# Patient Record
Sex: MALE | Race: WHITE | NOT HISPANIC OR LATINO | Employment: OTHER | URBAN - METROPOLITAN AREA
[De-identification: names, ages, dates, MRNs, and addresses within clinical notes are randomized per-mention and may not be internally consistent; named-entity substitution may affect disease eponyms.]

---

## 2021-01-20 ENCOUNTER — HOSPITAL ENCOUNTER (EMERGENCY)
Facility: CLINIC | Age: 76
Discharge: HOME | End: 2021-01-20
Attending: EMERGENCY MEDICINE | Admitting: EMERGENCY MEDICINE
Payer: MEDICARE

## 2021-01-20 VITALS
HEART RATE: 84 BPM | SYSTOLIC BLOOD PRESSURE: 161 MMHG | OXYGEN SATURATION: 95 % | TEMPERATURE: 98 F | RESPIRATION RATE: 18 BRPM | DIASTOLIC BLOOD PRESSURE: 89 MMHG

## 2021-01-20 DIAGNOSIS — M27.61 HEMORRHAGIC COMPLICATIONS OF DENTAL IMPLANT PLACEMENT: ICD-10-CM

## 2021-01-20 LAB
BASOPHILS # BLD AUTO: 0 10E9/L (ref 0–0.2)
BASOPHILS NFR BLD AUTO: 0.1 %
DIFFERENTIAL METHOD BLD: ABNORMAL
EOSINOPHIL # BLD AUTO: 0 10E9/L (ref 0–0.7)
EOSINOPHIL NFR BLD AUTO: 0.1 %
ERYTHROCYTE [DISTWIDTH] IN BLOOD BY AUTOMATED COUNT: 14.9 % (ref 10–15)
HCT VFR BLD AUTO: 39.5 % (ref 40–53)
HGB BLD-MCNC: 13.2 G/DL (ref 13.3–17.7)
IMM GRANULOCYTES # BLD: 0.1 10E9/L (ref 0–0.4)
IMM GRANULOCYTES NFR BLD: 0.5 %
INR PPP: 1.07 (ref 0.86–1.14)
LYMPHOCYTES # BLD AUTO: 0.3 10E9/L (ref 0.8–5.3)
LYMPHOCYTES NFR BLD AUTO: 2.5 %
MCH RBC QN AUTO: 29.9 PG (ref 26.5–33)
MCHC RBC AUTO-ENTMCNC: 33.4 G/DL (ref 31.5–36.5)
MCV RBC AUTO: 89 FL (ref 78–100)
MONOCYTES # BLD AUTO: 0.4 10E9/L (ref 0–1.3)
MONOCYTES NFR BLD AUTO: 3.6 %
NEUTROPHILS # BLD AUTO: 9.8 10E9/L (ref 1.6–8.3)
NEUTROPHILS NFR BLD AUTO: 93.2 %
NRBC # BLD AUTO: 0 10*3/UL
NRBC BLD AUTO-RTO: 0 /100
PLATELET # BLD AUTO: 192 10E9/L (ref 150–450)
RBC # BLD AUTO: 4.42 10E12/L (ref 4.4–5.9)
WBC # BLD AUTO: 10.5 10E9/L (ref 4–11)

## 2021-01-20 PROCEDURE — 85610 PROTHROMBIN TIME: CPT | Performed by: EMERGENCY MEDICINE

## 2021-01-20 PROCEDURE — 99283 EMERGENCY DEPT VISIT LOW MDM: CPT | Performed by: EMERGENCY MEDICINE

## 2021-01-20 PROCEDURE — 85025 COMPLETE CBC W/AUTO DIFF WBC: CPT | Performed by: EMERGENCY MEDICINE

## 2021-01-20 PROCEDURE — 99284 EMERGENCY DEPT VISIT MOD MDM: CPT | Performed by: EMERGENCY MEDICINE

## 2021-01-20 ASSESSMENT — ENCOUNTER SYMPTOMS: LIGHT-HEADEDNESS: 0

## 2021-01-21 NOTE — ED TRIAGE NOTES
BIBA from home pt had some teeth pulled this evening and now is having spontaneous bleeding starting at 2000. Pt takes aspirin, last taken yesterday  Diana Alejandre RN on 1/20/2021 at 8:55 PM

## 2021-01-21 NOTE — CONSULTS
ORAL & MAXILLOFACIAL SURGERY   CONSULT  Vijay Fernandez,  MRN: [id number],  : 1945        ASSESSMENT   75 year old male s/p full mouth extraction and placement of 4 dental implants in maxilla and mandible. Patient indicates that he went to [facility] in [place] around 1:30 PM and stated that the bleeding never really stopped, he was then BIBA. When patient was assessed he was hemostatic.       PLAN  - Avoid further trauma to area of extractions/implants  - Soft diet  - Salt water rinses to clean area  - Direct pressure if bleeding starts again  - Follow up with restoring dentist      Please contact the FS resident on-call with questions or concerns.    Discussed with chief resident and staff.    Jodie Catalan DDS  Oral & Maxillofacial Surgery, PGY-1    ____________________________________________      HPI  75 year old male with history significant for hypertension and hyperlipidemia s/p full mouth dental extraction and placement of 4 implants in maxilla and 4 implants in mandible this afternoon. Patient reports continued bleeding after appointment, he became concerned at home and was BIBA. When patient was assessed in the ED he was hemostatic.     HISTORY  Past Medical History: No past medical history on file.  Past Surgical History: No past surgical history on file.  Medications:   No current facility-administered medications on file prior to encounter.   No current outpatient medications on file prior to encounter.       Allergies: No Known Allergies  Social History:   Social History     Socioeconomic History     Marital status: Single     Spouse name: Not on file     Number of children: Not on file     Years of education: Not on file     Highest education level: Not on file   Occupational History     Not on file   Social Needs     Financial resource strain: Not on file     Food insecurity     Worry: Not on file     Inability: Not on file     Transportation needs     Medical: Not on  file     Non-medical: Not on file   Tobacco Use     Smoking status: Not on file   Substance and Sexual Activity     Alcohol use: Not on file     Drug use: Not on file     Sexual activity: Not on file   Lifestyle     Physical activity     Days per week: Not on file     Minutes per session: Not on file     Stress: Not on file   Relationships     Social connections     Talks on phone: Not on file     Gets together: Not on file     Attends [employer] service: Not on file     Active member of club or organization: Not on file     Attends meetings of clubs or organizations: Not on file     Relationship status: Not on file     Intimate partner violence     Fear of current or ex partner: Not on file     Emotionally abused: Not on file     Physically abused: Not on file     Forced sexual activity: Not on file   Other Topics Concern     Not on file   Social History Narrative     Not on file       REVIEW OF SYSTEMS  10 point ROS reviewed and negative aside from listed in HPI    PHYSICAL EXAM  Vital Signs:   Vitals:    01/20/21 2055   BP: (!) 140/74   Pulse: 86   Resp: 18   Temp: 98  F (36.7  C)   TempSrc: Axillary   SpO2: 95%       GEN: WD/WN male, NAD  HEENT: NC/AT, EOMI, PERRL, dried blood present on face and neck, patient worried about a possible cut on his palate, palate appears to have dried blood present and no laceration, area of extractions/implants have sutures present and are hemostatic  CV: RRR, no M/G/R, warm and well-perfused  PULM: CTAB, breathing comfortably on room air  GI: Soft, ND/NT  MSK: JOSHI, no peripheral extremity edema  NEURO: AAOx4, CN II-XII intact bilaterally  PSYCH: Appropriate mood and affect            REVIEW OF LABORATORY DATA  Most Recent 3 CBC's:  Recent Labs   Lab Test 01/20/21  2133   WBC 10.5   HGB 13.2*   MCV 89         Most Recent 3 BMP's:No lab results found.  Most Recent 2 LFT's:No lab results found.  Most Recent INR's and Anticoagulation Dosing History:  Anticoagulation Dose  History     Recent Dosing and Labs Latest Ref Rng & Units 1/20/2021    INR 0.86 - 1.14 1.07        Most Recent 3 Troponin's:No lab results found.  Most Recent Cholesterol Panel:No lab results found.  Most Recent 6 Bacteria Isolates From Any Culture (See EPIC Reports for Culture Details):No lab results found.  Most Recent TSH, T4 and A1c Labs:No lab results found.    IMAGING RESULTS (Include outside hospital results)     No imaging obtained

## 2021-01-21 NOTE — ED PROVIDER NOTES
ED Provider Note  [facility]      History     Chief Complaint   Patient presents with     Dental Problem     The history is provided by the patient and medical records.     Vijay Fernandez is a 75 year old male with a history of HTN and HLD who presents to the ED today via EMS for evaluation of a dental problem. The patient reports that he had all of his teeth removed today and immediately had bleeding after leaving. He states that the bleeding has gotten worse throughout the day. He is not actively bleeding now, but states that he has clots sitting in the bottom of his mouth. He denies being lightheaded. He reports use of baby aspirin, but none today. He is not otherwise anticoagulated.    Past Medical History  No past medical history on file.  No past surgical history on file.  No current outpatient medications on file.    No Known Allergies  Family History  No family history on file.  Social History   Social History     Tobacco Use     Smoking status: Not on file   Substance Use Topics     Alcohol use: Not on file     Drug use: Not on file      Past medical history, past surgical history, medications, allergies, family history, and social history were reviewed with the patient. No additional pertinent items.       Review of Systems   HENT: Positive for dental problem (bleeding).    Neurological: Negative for light-headedness.   All other systems reviewed and are negative.    A complete review of systems was performed with pertinent positives and negatives noted in the HPI, and all other systems negative.    Physical Exam   BP: (!) 140/74  Pulse: 86  Temp: 98  F (36.7  C)  Resp: 18  SpO2: 95 %  Physical Exam  Constitutional:       General: He is not in acute distress.     Appearance: He is not toxic-appearing.      Comments:  dried blood noted on his face.   HENT:      Mouth/Throat:      Comments: All teeth have been extracted from the upper and lower.  Patient has redness and visible  sutures upper and lower but no active bleeding.  There are some large clots that are noted in the mouth.  Patient has drills from implants in the upper and lower in place.  No active bleeding.  No oozing  Pulmonary:      Effort: Pulmonary effort is normal. No respiratory distress.   Musculoskeletal:      Right lower leg: No edema.      Left lower leg: No edema.   Skin:     General: Skin is warm.   Neurological:      General: No focal deficit present.      Mental Status: He is alert and oriented to person, place, and time. Mental status is at baseline.   Psychiatric:         Mood and Affect: Mood normal.         Behavior: Behavior normal.         Thought Content: Thought content normal.         ED Course      Procedures                 Results for orders placed or performed during the hospital encounter of 01/20/21   CBC with platelets differential     Status: Abnormal   Result Value Ref Range    WBC 10.5 4.0 - 11.0 10e9/L    RBC Count 4.42 4.4 - 5.9 10e12/L    Hemoglobin 13.2 (L) 13.3 - 17.7 g/dL    Hematocrit 39.5 (L) 40.0 - 53.0 %    MCV 89 78 - 100 fl    MCH 29.9 26.5 - 33.0 pg    MCHC 33.4 31.5 - 36.5 g/dL    RDW 14.9 10.0 - 15.0 %    Platelet Count 192 150 - 450 10e9/L    Diff Method Automated Method     % Neutrophils 93.2 %    % Lymphocytes 2.5 %    % Monocytes 3.6 %    % Eosinophils 0.1 %    % Basophils 0.1 %    % Immature Granulocytes 0.5 %    Nucleated RBCs 0 0 /100    Absolute Neutrophil 9.8 (H) 1.6 - 8.3 10e9/L    Absolute Lymphocytes 0.3 (L) 0.8 - 5.3 10e9/L    Absolute Monocytes 0.4 0.0 - 1.3 10e9/L    Absolute Eosinophils 0.0 0.0 - 0.7 10e9/L    Absolute Basophils 0.0 0.0 - 0.2 10e9/L    Abs Immature Granulocytes 0.1 0 - 0.4 10e9/L    Absolute Nucleated RBC 0.0    INR     Status: None   Result Value Ref Range    INR 1.07 0.86 - 1.14     Medications - No data to display             No results found for any visits on 01/20/21.  Medications - No data to display     Assessments & Plan (with Medical  Decision Making)  Patient is a very nice 75-year-old male who presented to the ER due to bleeding from his teeth.  Patient had multiple teeth extractions in the upper and lower and had beginning implants placed for permanent implants.  Patient said that he started bleeding at home so came for evaluation.  Patient on initial presentation had some dried blood on his face.  Patient did not have any active areas of bleeding.  Patient was hemodynamically stable on presentation.  We did check some baseline labs and his hemoglobin is stable at 13.  Patient has a normal INR and normal platelets.  Patient was seen by the dental resident who happened to be in the ER.  She evaluated him and saw no areas that need any further treatment.  Patient is hemostatic at this time and is stable for discharge.  This part of the medical record was transcribed by Stephy Sheehan Medical Scribe, from a dictation done by Nay Ragsdale MD.       I have reviewed the nursing notes. I have reviewed the findings, diagnosis, plan and need for follow up with the patient.    New Prescriptions    No medications on file       Final diagnoses:   Hemorrhagic complications of dental implant placement   I, Gayla Pimentel, am serving as a trained medical scribe to document services personally performed by Nay Ragsdale MD, based on the provider's statements to me.     I, Nay Ragsdale MD, was physically present and have reviewed and verified the accuracy of this note documented by Gayla Pimentel.      --  Nay Ragsdale MD  [facility] EMERGENCY DEPARTMENT  1/20/2021     Nay Ragsdale MD  01/21/21 0120

## 2021-01-21 NOTE — DISCHARGE INSTRUCTIONS
Your hemoglobin and platelets are stable/your blood counts are stable.     You were seen by the dentist who feels that you are stable.    Please follow up with your dentist for further care.    Hold pressure on the area of bleeding if any other problems/concerns.

## 2023-11-16 ENCOUNTER — TRANSFERRED RECORDS (OUTPATIENT)
Dept: HEALTH INFORMATION MANAGEMENT | Facility: CLINIC | Age: 78
End: 2023-11-16
Payer: COMMERCIAL

## 2023-11-20 ENCOUNTER — TRANSFERRED RECORDS (OUTPATIENT)
Dept: HEALTH INFORMATION MANAGEMENT | Facility: CLINIC | Age: 78
End: 2023-11-20
Payer: COMMERCIAL

## 2023-11-27 ENCOUNTER — TRANSFERRED RECORDS (OUTPATIENT)
Dept: HEALTH INFORMATION MANAGEMENT | Facility: CLINIC | Age: 78
End: 2023-11-27
Payer: COMMERCIAL

## 2023-12-04 ENCOUNTER — TRANSFERRED RECORDS (OUTPATIENT)
Dept: HEALTH INFORMATION MANAGEMENT | Facility: CLINIC | Age: 78
End: 2023-12-04
Payer: COMMERCIAL

## 2024-08-16 ENCOUNTER — TRANSFERRED RECORDS (OUTPATIENT)
Dept: MULTI SPECIALTY CLINIC | Facility: CLINIC | Age: 79
End: 2024-08-16
Payer: COMMERCIAL

## 2024-08-16 ENCOUNTER — TRANSFERRED RECORDS (OUTPATIENT)
Dept: HEALTH INFORMATION MANAGEMENT | Facility: CLINIC | Age: 79
End: 2024-08-16
Payer: COMMERCIAL

## 2024-08-16 LAB
CREATININE (EXTERNAL): 0.82 MG/DL (ref 0.73–1.18)
GFR ESTIMATED (EXTERNAL): >60 ML/MIN/1.73M2
GLUCOSE (EXTERNAL): 99 MG/DL (ref 70–100)
POTASSIUM (EXTERNAL): 4.6 MMOL/L (ref 3.5–5.1)
TSH SERPL-ACNC: 3.17 UIU/ML (ref 0.3–4.5)

## 2024-08-21 RX ORDER — OMEGA-3-ACID ETHYL ESTERS 900 MG/1
1000 CAPSULE, LIQUID FILLED ORAL DAILY
COMMUNITY
End: 2024-09-13

## 2024-08-21 RX ORDER — LEVOTHYROXINE SODIUM 25 UG/1
25 TABLET ORAL DAILY
COMMUNITY
Start: 2024-02-21

## 2024-08-21 RX ORDER — TAMSULOSIN HYDROCHLORIDE 0.4 MG/1
0.4 CAPSULE ORAL DAILY
COMMUNITY
Start: 2023-11-06

## 2024-08-21 RX ORDER — DONEPEZIL HYDROCHLORIDE 10 MG/1
15 TABLET, FILM COATED ORAL AT BEDTIME
COMMUNITY
Start: 2024-01-12

## 2024-09-04 RX ORDER — OXYCODONE HYDROCHLORIDE 5 MG/1
5 TABLET ORAL AT BEDTIME
Status: ON HOLD | COMMUNITY
End: 2024-09-11

## 2024-09-04 RX ORDER — ACETAMINOPHEN 500 MG
1000 TABLET ORAL EVERY 6 HOURS PRN
COMMUNITY

## 2024-09-04 RX ORDER — MULTIVITAMIN,THERAPEUTIC
1 TABLET ORAL DAILY
COMMUNITY

## 2024-09-04 NOTE — PROGRESS NOTES
Planning to discharge home on POD 1 in the morning with his partner helping him.       09/04/24 2779   Discharge Planning   Patient/Family Anticipates Transition to home with family  (outpatient PT arranged at [facility])   Concerns to be Addressed all concerns addressed in this encounter   Living Arrangements   People in Home significant other   Type of Residence Private Residence   Is your private residence a single family home or apartment? Single family home   Number of Stairs, Within Home, Primary greater than 10 stairs   Stair Railings, Within Home, Primary railings safe and in good condition   Once home, are you able to live on one level? No   Which rooms are not on the main level? Bedroom   Bathroom Shower/Tub Tub/Shower unit   Equipment Currently Used at Home grab bar, tub/shower  (Has a cane at home)   Support System   Support Systems Spouse/Significant Other  (significant other, Abeba)   Do you have someone available to stay with you one or two nights once you are home? Yes   Education   Patient attended total joint pre-op class/received pre-op teaching  email/phone call

## 2024-09-10 ENCOUNTER — ANESTHESIA EVENT (OUTPATIENT)
Dept: SURGERY | Facility: CLINIC | Age: 79
End: 2024-09-10
Payer: MEDICARE

## 2024-09-11 ENCOUNTER — HOSPITAL ENCOUNTER (OUTPATIENT)
Facility: CLINIC | Age: 79
Discharge: HOME | End: 2024-09-12
Attending: ORTHOPAEDIC SURGERY | Admitting: ORTHOPAEDIC SURGERY
Payer: MEDICARE

## 2024-09-11 ENCOUNTER — APPOINTMENT (OUTPATIENT)
Dept: RADIOLOGY | Facility: CLINIC | Age: 79
End: 2024-09-11
Attending: ORTHOPAEDIC SURGERY
Payer: MEDICARE

## 2024-09-11 ENCOUNTER — ANESTHESIA (OUTPATIENT)
Dept: SURGERY | Facility: CLINIC | Age: 79
End: 2024-09-11
Payer: MEDICARE

## 2024-09-11 ENCOUNTER — ANCILLARY PROCEDURE (OUTPATIENT)
Dept: ULTRASOUND IMAGING | Facility: CLINIC | Age: 79
End: 2024-09-11
Attending: ANESTHESIOLOGY
Payer: COMMERCIAL

## 2024-09-11 DIAGNOSIS — Z96.652 S/P TOTAL KNEE ARTHROPLASTY, LEFT: Primary | ICD-10-CM

## 2024-09-11 PROCEDURE — 250N000013 HC RX MED GY IP 250 OP 250 PS 637: Performed by: PHYSICIAN ASSISTANT

## 2024-09-11 PROCEDURE — 999N000141 HC STATISTIC PRE-PROCEDURE NURSING ASSESSMENT: Performed by: ORTHOPAEDIC SURGERY

## 2024-09-11 PROCEDURE — C1776 JOINT DEVICE (IMPLANTABLE): HCPCS | Performed by: ORTHOPAEDIC SURGERY

## 2024-09-11 PROCEDURE — 250N000011 HC RX IP 250 OP 636: Performed by: ANESTHESIOLOGY

## 2024-09-11 PROCEDURE — 250N000011 HC RX IP 250 OP 636: Performed by: ORTHOPAEDIC SURGERY

## 2024-09-11 PROCEDURE — 250N000009 HC RX 250: Performed by: PHYSICIAN ASSISTANT

## 2024-09-11 PROCEDURE — 258N000003 HC RX IP 258 OP 636: Performed by: ANESTHESIOLOGY

## 2024-09-11 PROCEDURE — 250N000011 HC RX IP 250 OP 636: Performed by: NURSE ANESTHETIST, CERTIFIED REGISTERED

## 2024-09-11 PROCEDURE — 360N000077 HC SURGERY LEVEL 4, PER MIN: Performed by: ORTHOPAEDIC SURGERY

## 2024-09-11 PROCEDURE — 999N000065 XR KNEE PORT LEFT 1/2 VIEWS: Mod: LT

## 2024-09-11 PROCEDURE — 250N000013 HC RX MED GY IP 250 OP 250 PS 637: Performed by: ORTHOPAEDIC SURGERY

## 2024-09-11 PROCEDURE — 258N000003 HC RX IP 258 OP 636: Performed by: NURSE ANESTHETIST, CERTIFIED REGISTERED

## 2024-09-11 PROCEDURE — 258N000001 HC RX 258: Performed by: ORTHOPAEDIC SURGERY

## 2024-09-11 PROCEDURE — 258N000003 HC RX IP 258 OP 636: Performed by: ORTHOPAEDIC SURGERY

## 2024-09-11 PROCEDURE — 370N000017 HC ANESTHESIA TECHNICAL FEE, PER MIN: Performed by: ORTHOPAEDIC SURGERY

## 2024-09-11 PROCEDURE — C1713 ANCHOR/SCREW BN/BN,TIS/BN: HCPCS | Performed by: ORTHOPAEDIC SURGERY

## 2024-09-11 PROCEDURE — 272N000001 HC OR GENERAL SUPPLY STERILE: Performed by: ORTHOPAEDIC SURGERY

## 2024-09-11 PROCEDURE — 250N000011 HC RX IP 250 OP 636: Performed by: PHYSICIAN ASSISTANT

## 2024-09-11 PROCEDURE — 710N000010 HC RECOVERY PHASE 1, LEVEL 2, PER MIN: Performed by: ORTHOPAEDIC SURGERY

## 2024-09-11 PROCEDURE — 250N000009 HC RX 250: Performed by: NURSE ANESTHETIST, CERTIFIED REGISTERED

## 2024-09-11 PROCEDURE — 250N000009 HC RX 250: Performed by: ORTHOPAEDIC SURGERY

## 2024-09-11 DEVICE — IMPLANTABLE DEVICE: Type: IMPLANTABLE DEVICE | Site: KNEE | Status: FUNCTIONAL

## 2024-09-11 DEVICE — SCREW GD 48MM ZUK HEX HD MIS STRL LF: Type: IMPLANTABLE DEVICE | Site: KNEE | Status: FUNCTIONAL

## 2024-09-11 RX ORDER — AMOXICILLIN 250 MG
1 CAPSULE ORAL 2 TIMES DAILY PRN
Qty: 42 TABLET | Refills: 0 | Status: SHIPPED | OUTPATIENT
Start: 2024-09-11

## 2024-09-11 RX ORDER — SODIUM CHLORIDE, SODIUM LACTATE, POTASSIUM CHLORIDE, CALCIUM CHLORIDE 600; 310; 30; 20 MG/100ML; MG/100ML; MG/100ML; MG/100ML
INJECTION, SOLUTION INTRAVENOUS CONTINUOUS
Status: DISCONTINUED | OUTPATIENT
Start: 2024-09-11 | End: 2024-09-11 | Stop reason: HOSPADM

## 2024-09-11 RX ORDER — LEVOTHYROXINE SODIUM 25 UG/1
25 TABLET ORAL DAILY
Status: DISCONTINUED | OUTPATIENT
Start: 2024-09-12 | End: 2024-09-12 | Stop reason: HOSPADM

## 2024-09-11 RX ORDER — LIDOCAINE HYDROCHLORIDE 10 MG/ML
INJECTION, SOLUTION INFILTRATION; PERINEURAL PRN
Status: DISCONTINUED | OUTPATIENT
Start: 2024-09-11 | End: 2024-09-11

## 2024-09-11 RX ORDER — HYDROMORPHONE HCL IN WATER/PF 6 MG/30 ML
0.4 PATIENT CONTROLLED ANALGESIA SYRINGE INTRAVENOUS
Status: DISCONTINUED | OUTPATIENT
Start: 2024-09-11 | End: 2024-09-12 | Stop reason: HOSPADM

## 2024-09-11 RX ORDER — HYDROMORPHONE HCL IN WATER/PF 6 MG/30 ML
0.2 PATIENT CONTROLLED ANALGESIA SYRINGE INTRAVENOUS EVERY 5 MIN PRN
Status: DISCONTINUED | OUTPATIENT
Start: 2024-09-11 | End: 2024-09-11 | Stop reason: HOSPADM

## 2024-09-11 RX ORDER — TAMSULOSIN HYDROCHLORIDE 0.4 MG/1
0.4 CAPSULE ORAL DAILY
Status: DISCONTINUED | OUTPATIENT
Start: 2024-09-12 | End: 2024-09-12 | Stop reason: HOSPADM

## 2024-09-11 RX ORDER — ONDANSETRON 4 MG/1
4 TABLET, ORALLY DISINTEGRATING ORAL EVERY 30 MIN PRN
Status: DISCONTINUED | OUTPATIENT
Start: 2024-09-11 | End: 2024-09-11 | Stop reason: HOSPADM

## 2024-09-11 RX ORDER — NALOXONE HYDROCHLORIDE 0.4 MG/ML
0.1 INJECTION, SOLUTION INTRAMUSCULAR; INTRAVENOUS; SUBCUTANEOUS
Status: DISCONTINUED | OUTPATIENT
Start: 2024-09-11 | End: 2024-09-12 | Stop reason: HOSPADM

## 2024-09-11 RX ORDER — TRANEXAMIC ACID 650 MG/1
1950 TABLET ORAL ONCE
Status: COMPLETED | OUTPATIENT
Start: 2024-09-11 | End: 2024-09-11

## 2024-09-11 RX ORDER — ACETAMINOPHEN 325 MG/1
975 TABLET ORAL EVERY 8 HOURS
Status: DISCONTINUED | OUTPATIENT
Start: 2024-09-11 | End: 2024-09-12 | Stop reason: HOSPADM

## 2024-09-11 RX ORDER — ONDANSETRON 4 MG/1
4 TABLET, ORALLY DISINTEGRATING ORAL EVERY 30 MIN PRN
Status: DISCONTINUED | OUTPATIENT
Start: 2024-09-11 | End: 2024-09-11

## 2024-09-11 RX ORDER — DEXAMETHASONE SODIUM PHOSPHATE 10 MG/ML
INJECTION, SOLUTION INTRAMUSCULAR; INTRAVENOUS PRN
Status: DISCONTINUED | OUTPATIENT
Start: 2024-09-11 | End: 2024-09-11

## 2024-09-11 RX ORDER — ONDANSETRON 2 MG/ML
4 INJECTION INTRAMUSCULAR; INTRAVENOUS EVERY 30 MIN PRN
Status: DISCONTINUED | OUTPATIENT
Start: 2024-09-11 | End: 2024-09-11

## 2024-09-11 RX ORDER — CEFAZOLIN SODIUM/WATER 2 G/20 ML
2 SYRINGE (ML) INTRAVENOUS
Status: COMPLETED | OUTPATIENT
Start: 2024-09-11 | End: 2024-09-11

## 2024-09-11 RX ORDER — ONDANSETRON 2 MG/ML
4 INJECTION INTRAMUSCULAR; INTRAVENOUS EVERY 30 MIN PRN
Status: DISCONTINUED | OUTPATIENT
Start: 2024-09-11 | End: 2024-09-11 | Stop reason: HOSPADM

## 2024-09-11 RX ORDER — OXYCODONE HYDROCHLORIDE 5 MG/1
10 TABLET ORAL EVERY 4 HOURS PRN
Status: DISCONTINUED | OUTPATIENT
Start: 2024-09-11 | End: 2024-09-12 | Stop reason: HOSPADM

## 2024-09-11 RX ORDER — ASPIRIN 81 MG/1
81 TABLET ORAL 2 TIMES DAILY
Qty: 84 TABLET | Refills: 0 | Status: SHIPPED | OUTPATIENT
Start: 2024-09-12 | End: 2024-10-24

## 2024-09-11 RX ORDER — FENTANYL CITRATE 50 UG/ML
50 INJECTION, SOLUTION INTRAMUSCULAR; INTRAVENOUS EVERY 5 MIN PRN
Status: DISCONTINUED | OUTPATIENT
Start: 2024-09-11 | End: 2024-09-11 | Stop reason: HOSPADM

## 2024-09-11 RX ORDER — LIDOCAINE 40 MG/G
CREAM TOPICAL
Status: DISCONTINUED | OUTPATIENT
Start: 2024-09-11 | End: 2024-09-12 | Stop reason: HOSPADM

## 2024-09-11 RX ORDER — LIDOCAINE 40 MG/G
CREAM TOPICAL
Status: DISCONTINUED | OUTPATIENT
Start: 2024-09-11 | End: 2024-09-11 | Stop reason: HOSPADM

## 2024-09-11 RX ORDER — PROPOFOL 10 MG/ML
INJECTION, EMULSION INTRAVENOUS CONTINUOUS PRN
Status: DISCONTINUED | OUTPATIENT
Start: 2024-09-11 | End: 2024-09-11

## 2024-09-11 RX ORDER — HYDROMORPHONE HCL IN WATER/PF 6 MG/30 ML
0.2 PATIENT CONTROLLED ANALGESIA SYRINGE INTRAVENOUS
Status: DISCONTINUED | OUTPATIENT
Start: 2024-09-11 | End: 2024-09-12 | Stop reason: HOSPADM

## 2024-09-11 RX ORDER — GLYCOPYRROLATE 0.2 MG/ML
INJECTION, SOLUTION INTRAMUSCULAR; INTRAVENOUS PRN
Status: DISCONTINUED | OUTPATIENT
Start: 2024-09-11 | End: 2024-09-11

## 2024-09-11 RX ORDER — ACETAMINOPHEN 325 MG/1
650 TABLET ORAL EVERY 4 HOURS PRN
Status: DISCONTINUED | OUTPATIENT
Start: 2024-09-14 | End: 2024-09-12 | Stop reason: HOSPADM

## 2024-09-11 RX ORDER — ONDANSETRON 2 MG/ML
INJECTION INTRAMUSCULAR; INTRAVENOUS PRN
Status: DISCONTINUED | OUTPATIENT
Start: 2024-09-11 | End: 2024-09-11

## 2024-09-11 RX ORDER — OXYCODONE HYDROCHLORIDE 5 MG/1
10 TABLET ORAL
Status: DISCONTINUED | OUTPATIENT
Start: 2024-09-11 | End: 2024-09-11

## 2024-09-11 RX ORDER — BUPIVACAINE HYDROCHLORIDE 7.5 MG/ML
INJECTION, SOLUTION INTRASPINAL
Status: COMPLETED | OUTPATIENT
Start: 2024-09-11 | End: 2024-09-11

## 2024-09-11 RX ORDER — AMOXICILLIN 250 MG
1 CAPSULE ORAL 2 TIMES DAILY
Status: DISCONTINUED | OUTPATIENT
Start: 2024-09-11 | End: 2024-09-12 | Stop reason: HOSPADM

## 2024-09-11 RX ORDER — ONDANSETRON 4 MG/1
4 TABLET, ORALLY DISINTEGRATING ORAL EVERY 6 HOURS PRN
Status: DISCONTINUED | OUTPATIENT
Start: 2024-09-11 | End: 2024-09-12 | Stop reason: HOSPADM

## 2024-09-11 RX ORDER — OXYCODONE HYDROCHLORIDE 5 MG/1
5 TABLET ORAL
Status: DISCONTINUED | OUTPATIENT
Start: 2024-09-11 | End: 2024-09-11

## 2024-09-11 RX ORDER — BUPIVACAINE HYDROCHLORIDE 5 MG/ML
INJECTION, SOLUTION EPIDURAL; INTRACAUDAL
Status: COMPLETED | OUTPATIENT
Start: 2024-09-11 | End: 2024-09-11

## 2024-09-11 RX ORDER — ACETAMINOPHEN 325 MG/1
975 TABLET ORAL ONCE
Status: COMPLETED | OUTPATIENT
Start: 2024-09-11 | End: 2024-09-11

## 2024-09-11 RX ORDER — ASPIRIN 81 MG/1
81 TABLET ORAL 2 TIMES DAILY
Status: DISCONTINUED | OUTPATIENT
Start: 2024-09-11 | End: 2024-09-12 | Stop reason: HOSPADM

## 2024-09-11 RX ORDER — POLYETHYLENE GLYCOL 3350 17 G/17G
17 POWDER, FOR SOLUTION ORAL DAILY
Status: DISCONTINUED | OUTPATIENT
Start: 2024-09-12 | End: 2024-09-12 | Stop reason: HOSPADM

## 2024-09-11 RX ORDER — FENTANYL CITRATE 50 UG/ML
25-100 INJECTION, SOLUTION INTRAMUSCULAR; INTRAVENOUS
Status: DISCONTINUED | OUTPATIENT
Start: 2024-09-11 | End: 2024-09-11 | Stop reason: HOSPADM

## 2024-09-11 RX ORDER — BISACODYL 10 MG
10 SUPPOSITORY, RECTAL RECTAL DAILY PRN
Status: DISCONTINUED | OUTPATIENT
Start: 2024-09-11 | End: 2024-09-12 | Stop reason: HOSPADM

## 2024-09-11 RX ORDER — DEXAMETHASONE SODIUM PHOSPHATE 4 MG/ML
4 INJECTION, SOLUTION INTRA-ARTICULAR; INTRALESIONAL; INTRAMUSCULAR; INTRAVENOUS; SOFT TISSUE
Status: DISCONTINUED | OUTPATIENT
Start: 2024-09-11 | End: 2024-09-11

## 2024-09-11 RX ORDER — ONDANSETRON 2 MG/ML
4 INJECTION INTRAMUSCULAR; INTRAVENOUS EVERY 6 HOURS PRN
Status: DISCONTINUED | OUTPATIENT
Start: 2024-09-11 | End: 2024-09-12 | Stop reason: HOSPADM

## 2024-09-11 RX ORDER — OXYCODONE HYDROCHLORIDE 5 MG/1
5 TABLET ORAL EVERY 4 HOURS PRN
Status: DISCONTINUED | OUTPATIENT
Start: 2024-09-11 | End: 2024-09-12 | Stop reason: HOSPADM

## 2024-09-11 RX ORDER — NALOXONE HYDROCHLORIDE 0.4 MG/ML
0.1 INJECTION, SOLUTION INTRAMUSCULAR; INTRAVENOUS; SUBCUTANEOUS
Status: DISCONTINUED | OUTPATIENT
Start: 2024-09-11 | End: 2024-09-11 | Stop reason: HOSPADM

## 2024-09-11 RX ORDER — DEXAMETHASONE SODIUM PHOSPHATE 4 MG/ML
4 INJECTION, SOLUTION INTRA-ARTICULAR; INTRALESIONAL; INTRAMUSCULAR; INTRAVENOUS; SOFT TISSUE
Status: DISCONTINUED | OUTPATIENT
Start: 2024-09-11 | End: 2024-09-11 | Stop reason: HOSPADM

## 2024-09-11 RX ORDER — SODIUM CHLORIDE, SODIUM LACTATE, POTASSIUM CHLORIDE, CALCIUM CHLORIDE 600; 310; 30; 20 MG/100ML; MG/100ML; MG/100ML; MG/100ML
INJECTION, SOLUTION INTRAVENOUS CONTINUOUS
Status: DISCONTINUED | OUTPATIENT
Start: 2024-09-11 | End: 2024-09-12 | Stop reason: HOSPADM

## 2024-09-11 RX ORDER — MAGNESIUM HYDROXIDE 1200 MG/15ML
LIQUID ORAL PRN
Status: DISCONTINUED | OUTPATIENT
Start: 2024-09-11 | End: 2024-09-11 | Stop reason: HOSPADM

## 2024-09-11 RX ORDER — FENTANYL CITRATE 50 UG/ML
25 INJECTION, SOLUTION INTRAMUSCULAR; INTRAVENOUS EVERY 5 MIN PRN
Status: DISCONTINUED | OUTPATIENT
Start: 2024-09-11 | End: 2024-09-11 | Stop reason: HOSPADM

## 2024-09-11 RX ORDER — CELECOXIB 200 MG/1
400 CAPSULE ORAL ONCE
Status: COMPLETED | OUTPATIENT
Start: 2024-09-11 | End: 2024-09-11

## 2024-09-11 RX ORDER — CEFAZOLIN SODIUM 2 G/100ML
2 INJECTION, SOLUTION INTRAVENOUS EVERY 8 HOURS
Status: COMPLETED | OUTPATIENT
Start: 2024-09-11 | End: 2024-09-12

## 2024-09-11 RX ORDER — PROCHLORPERAZINE MALEATE 5 MG
5 TABLET ORAL EVERY 6 HOURS PRN
Status: DISCONTINUED | OUTPATIENT
Start: 2024-09-11 | End: 2024-09-12 | Stop reason: HOSPADM

## 2024-09-11 RX ORDER — EPHEDRINE SULFATE 50 MG/ML
INJECTION, SOLUTION INTRAMUSCULAR; INTRAVENOUS; SUBCUTANEOUS PRN
Status: DISCONTINUED | OUTPATIENT
Start: 2024-09-11 | End: 2024-09-11

## 2024-09-11 RX ORDER — PROPOFOL 10 MG/ML
INJECTION, EMULSION INTRAVENOUS PRN
Status: DISCONTINUED | OUTPATIENT
Start: 2024-09-11 | End: 2024-09-11

## 2024-09-11 RX ORDER — CEFAZOLIN SODIUM/WATER 2 G/20 ML
2 SYRINGE (ML) INTRAVENOUS SEE ADMIN INSTRUCTIONS
Status: DISCONTINUED | OUTPATIENT
Start: 2024-09-11 | End: 2024-09-11 | Stop reason: HOSPADM

## 2024-09-11 RX ORDER — HYDROMORPHONE HCL IN WATER/PF 6 MG/30 ML
0.4 PATIENT CONTROLLED ANALGESIA SYRINGE INTRAVENOUS EVERY 5 MIN PRN
Status: DISCONTINUED | OUTPATIENT
Start: 2024-09-11 | End: 2024-09-11 | Stop reason: HOSPADM

## 2024-09-11 RX ADMIN — CELECOXIB 400 MG: 200 CAPSULE ORAL at 11:07

## 2024-09-11 RX ADMIN — ONDANSETRON 4 MG: 2 INJECTION INTRAMUSCULAR; INTRAVENOUS at 13:10

## 2024-09-11 RX ADMIN — PROPOFOL 75 MCG/KG/MIN: 10 INJECTION, EMULSION INTRAVENOUS at 13:10

## 2024-09-11 RX ADMIN — Medication 10 MG: at 14:13

## 2024-09-11 RX ADMIN — BUPIVACAINE HYDROCHLORIDE IN DEXTROSE 1.6 ML: 7.5 INJECTION, SOLUTION SUBARACHNOID at 13:07

## 2024-09-11 RX ADMIN — TRANEXAMIC ACID 1950 MG: 650 TABLET ORAL at 11:08

## 2024-09-11 RX ADMIN — Medication 2 G: at 13:00

## 2024-09-11 RX ADMIN — LIDOCAINE HYDROCHLORIDE 5 ML: 10 INJECTION, SOLUTION INFILTRATION; PERINEURAL at 13:10

## 2024-09-11 RX ADMIN — CEFAZOLIN SODIUM 2 G: 2 INJECTION, SOLUTION INTRAVENOUS at 20:34

## 2024-09-11 RX ADMIN — SODIUM CHLORIDE, POTASSIUM CHLORIDE, SODIUM LACTATE AND CALCIUM CHLORIDE 1000 ML: 600; 310; 30; 20 INJECTION, SOLUTION INTRAVENOUS at 11:30

## 2024-09-11 RX ADMIN — SODIUM CHLORIDE, POTASSIUM CHLORIDE, SODIUM LACTATE AND CALCIUM CHLORIDE: 600; 310; 30; 20 INJECTION, SOLUTION INTRAVENOUS at 13:45

## 2024-09-11 RX ADMIN — PHENYLEPHRINE HYDROCHLORIDE 0.3 MCG/KG/MIN: 10 INJECTION INTRAVENOUS at 13:10

## 2024-09-11 RX ADMIN — PROPOFOL 20 MG: 10 INJECTION, EMULSION INTRAVENOUS at 13:14

## 2024-09-11 RX ADMIN — ACETAMINOPHEN 975 MG: 325 TABLET ORAL at 19:01

## 2024-09-11 RX ADMIN — GLYCOPYRROLATE 0.2 MG: 0.2 INJECTION INTRAMUSCULAR; INTRAVENOUS at 13:27

## 2024-09-11 RX ADMIN — PROPOFOL 20 MG: 10 INJECTION, EMULSION INTRAVENOUS at 13:17

## 2024-09-11 RX ADMIN — SODIUM CHLORIDE, POTASSIUM CHLORIDE, SODIUM LACTATE AND CALCIUM CHLORIDE: 600; 310; 30; 20 INJECTION, SOLUTION INTRAVENOUS at 17:17

## 2024-09-11 RX ADMIN — DONEPEZIL HYDROCHLORIDE 15 MG: 10 TABLET ORAL at 20:33

## 2024-09-11 RX ADMIN — ACETAMINOPHEN 975 MG: 325 TABLET ORAL at 11:07

## 2024-09-11 RX ADMIN — DEXAMETHASONE SODIUM PHOSPHATE 10 MG: 10 INJECTION, SOLUTION INTRAMUSCULAR; INTRAVENOUS at 13:10

## 2024-09-11 RX ADMIN — FENTANYL CITRATE 50 MCG: 50 INJECTION, SOLUTION INTRAMUSCULAR; INTRAVENOUS at 12:21

## 2024-09-11 RX ADMIN — Medication 5 MG: at 14:15

## 2024-09-11 RX ADMIN — ASPIRIN 81 MG: 81 TABLET, COATED ORAL at 20:33

## 2024-09-11 RX ADMIN — PROPOFOL 20 MG: 10 INJECTION, EMULSION INTRAVENOUS at 13:10

## 2024-09-11 RX ADMIN — BUPIVACAINE HYDROCHLORIDE 20 ML: 5 INJECTION, SOLUTION EPIDURAL; INTRACAUDAL; PERINEURAL at 12:20

## 2024-09-11 ASSESSMENT — ACTIVITIES OF DAILY LIVING (ADL)
ADLS_ACUITY_SCORE: 20
ADLS_ACUITY_SCORE: 20
ADLS_ACUITY_SCORE: 21
ADLS_ACUITY_SCORE: 20
ADLS_ACUITY_SCORE: 21
ADLS_ACUITY_SCORE: 21
ADLS_ACUITY_SCORE: 20
ADLS_ACUITY_SCORE: 18

## 2024-09-11 NOTE — ANESTHESIA POSTPROCEDURE EVALUATION
Patient: Vijay Fernandez    Procedure: Procedure(s):  LEFT TOTAL KNEE ARTHROPLASTY       Anesthesia Type:  Spinal    Note:     Postop Pain Control: Uneventful            Sign Out: Well controlled pain   PONV: No   Neuro/Psych: Uneventful            Sign Out: Acceptable/Baseline neuro status   Airway/Respiratory: Uneventful            Sign Out: Acceptable/Baseline resp. status   CV/Hemodynamics: Uneventful            Sign Out: Acceptable CV status; No obvious hypovolemia; No obvious fluid overload   Other NRE:    DID A NON-ROUTINE EVENT OCCUR? No           Last vitals:  Vitals Value Taken Time   /60 09/11/24 1508   Temp 35.6  C (96.08  F) 09/11/24 1508   Pulse 53 09/11/24 1508   Resp 12 09/11/24 1504   SpO2 97 % 09/11/24 1508   Vitals shown include unfiled device data.    Electronically Signed By: Jono Whaley MD  September 11, 2024  3:10 PM

## 2024-09-11 NOTE — INTERVAL H&P NOTE
"I have reviewed the surgical (or preoperative) H&P that is linked to this encounter, and examined the patient. There are no significant changes    Clinical Conditions Present on Arrival:  Clinically Significant Risk Factors Present on Admission                      # Overweight: Estimated body mass index is 29.53 kg/m  as calculated from the following:    Height as of this encounter: 1.753 m (5' 9\").    Weight as of this encounter: 90.7 kg (200 lb).       "

## 2024-09-11 NOTE — ANESTHESIA PREPROCEDURE EVALUATION
Anesthesia Pre-Procedure Evaluation    Patient: Vijay Fernandez   MRN: [id number] : 1945        Procedure : Procedure(s):  LEFT TOTAL KNEE ARTHROPLASTY          Past Medical History:   Diagnosis Date    Arthritis     BPH with urinary obstruction     History of blood transfusion     Hypothyroidism     Left-sided low back pain without sciatica     MCI (mild cognitive impairment)     Memory loss     Pelvic fracture (H)     34 fractures in pelvis from crush injury    Prostate cancer metastatic to bone (H)       Past Surgical History:   Procedure Laterality Date    AS TOTAL KNEE ARTHROPLASTY Right     TONSILLECTOMY & ADENOIDECTOMY        No Known Allergies   Social History     Tobacco Use    Smoking status: Never    Smokeless tobacco: Never   Substance Use Topics    Alcohol use: Yes     Comment: 3-7 drinks per week      Wt Readings from Last 1 Encounters:   24 90.7 kg (200 lb)        Anesthesia Evaluation        No history of anesthetic complications       ROS/MED HX  ENT/Pulmonary:  - neg pulmonary ROS     Neurologic: Comment: Hx mild cognitive impairment - neg neurologic ROS     Cardiovascular:  - neg cardiovascular ROS     METS/Exercise Tolerance:     Hematologic:     (+)      anemia,          Musculoskeletal:   (+)  arthritis,             GI/Hepatic:  - neg GI/hepatic ROS     Renal/Genitourinary:     (+)        BPH,      Endo:     (+)          thyroid problem, hypothyroidism,           Psychiatric/Substance Use:  - neg psychiatric ROS     Infectious Disease:  - neg infectious disease ROS     Malignancy:   (+) Malignancy (Metastatic to bone), History of Prostate.    Other:  - neg other ROS          Physical Exam    Airway  airway exam normal      Mallampati: II   TM distance: > 3 FB   Neck ROM: full   Mouth opening: > 3 cm    Respiratory Devices and Support         Dental       (+) Completely normal teeth      Cardiovascular   cardiovascular exam normal          Pulmonary   pulmonary exam  "normal                OUTSIDE LABS:  CBC:   Lab Results   Component Value Date    WBC 10.5 01/20/2021    HGB 13.2 (L) 01/20/2021    HCT 39.5 (L) 01/20/2021     01/20/2021     BMP: No results found for: \"NA\", \"POTASSIUM\", \"CHLORIDE\", \"CO2\", \"BUN\", \"CR\", \"GLC\"  COAGS:   Lab Results   Component Value Date    INR 1.07 01/20/2021     POC: No results found for: \"BGM\", \"HCG\", \"HCGS\"  HEPATIC: No results found for: \"ALBUMIN\", \"PROTTOTAL\", \"ALT\", \"AST\", \"GGT\", \"ALKPHOS\", \"BILITOTAL\", \"BILIDIRECT\", \"LINA\"  OTHER: No results found for: \"PH\", \"LACT\", \"A1C\", \"JIN\", \"PHOS\", \"MAG\", \"LIPASE\", \"AMYLASE\", \"TSH\", \"T4\", \"T3\", \"CRP\", \"SED\"    Anesthesia Plan    ASA Status:  2       Anesthesia Type: Spinal.              Consents    Anesthesia Plan(s) and associated risks, benefits, and realistic alternatives discussed. Questions answered and patient/representative(s) expressed understanding.     - Discussed:     - Discussed with:  Patient            Postoperative Care    Pain management: IV analgesics, Oral pain medications, Peripheral nerve block (Single Shot).   PONV prophylaxis: Ondansetron (or other 5HT-3)     Comments:               Jono Whaley MD    I have reviewed the pertinent notes and labs in the chart from the past 30 days and (re)examined the patient.  Any updates or changes from those notes are reflected in this note.              # Overweight: Estimated body mass index is 29.53 kg/m  as calculated from the following:    Height as of this encounter: 1.753 m (5' 9\").    Weight as of this encounter: 90.7 kg (200 lb).      "

## 2024-09-11 NOTE — OR NURSING
CRNA at bedside administering Ephedrine for hypotension. BPs have increased to low 100s systolic.    no

## 2024-09-11 NOTE — ANESTHESIA PROCEDURE NOTES
"Intrathecal injection Procedure Note    Pre-Procedure   Staff -        Anesthesiologist:  Jono Whaley MD       Performed By: anesthesiologist       Location: OR       Procedure Start/Stop Times: 9/11/2024 1:07 PM and 9/11/2024 1:10 PM       Pre-Anesthestic Checklist: patient identified, IV checked, risks and benefits discussed, informed consent, monitors and equipment checked, pre-op evaluation, at physician/surgeon's request and post-op pain management  Timeout:       Correct Patient: Yes        Correct Procedure: Yes        Correct Site: Yes        Correct Position: Yes   Procedure Documentation  Procedure: intrathecal injection       Patient Position: sitting       Skin prep: Chloraprep       Insertion Site: L4-5. (midline approach).       Needle Gauge: 27.        Needle Length (Inches): 3.5        Spinal Needle Type: Pencan       Introducer used  Medication(s) Administered   0.75% Hyperbaric Bupivacaine (Intrathecal) - Intrathecal   1.6 mL - 9/11/2024 1:07:00 PM  Medication Administration Time: 9/11/2024 1:07 PM      FOR [facility] ([facility]) ONLY:   Pain Team Contact information: please page the Pain Team Via [employer]. Search \"Pain\". During daytime hours, please page the attending first. At night please page the resident first.      "

## 2024-09-11 NOTE — PHARMACY-ADMISSION MEDICATION HISTORY
Pharmacist Admission Medication History    Admission medication history is complete. The information provided in this note is only as accurate as the sources available at the time of the update.    Information Source(s): Patient, wife, and dispense report via in-person    Pertinent Information:   Allergies reviewed with patient and updates made in EHR: no    Medication History Completed By: Ronna Moctezuma RPH 9/11/2024 12:26 PM    PTA Med List   Medication Sig Last Dose    acetaminophen (TYLENOL) 500 MG tablet Take 1,000 mg by mouth every 6 hours as needed for mild pain. 9/10/2024    apalutamide (ERLEADA) 60 MG tablet Take 240 mg by mouth daily. 9/11/2024 at AM, can bring in med tomorrow    donepezil (ARICEPT) 10 MG tablet Take 15 mg by mouth at bedtime. 9/10/2024 at PM    levothyroxine (SYNTHROID/LEVOTHROID) 25 MCG tablet Take 25 mcg by mouth daily. 9/10/2024 at AM    multivitamin, therapeutic (THERA-VIT) TABS tablet Take 1 tablet by mouth daily. 9/1/2024    Omega-3-acid Ethyl Esters (FISH OIL OMEGA-3 FATTY ACID) 320MG/ML oral suspension Take 1,000 mg by mouth daily. 9/1/2024    oxyCODONE (ROXICODONE) 5 MG tablet Take 5 mg by mouth at bedtime. 9/10/2024 at PM    tamsulosin (FLOMAX) 0.4 MG capsule Take 0.4 mg by mouth daily. 9/11/2024 at AM    vitamin B-Complex Take 1 tablet by mouth daily. 9/1/2024

## 2024-09-11 NOTE — ANESTHESIA CARE TRANSFER NOTE
Patient: Vijay Fernandez    Procedure: Procedure(s):  LEFT TOTAL KNEE ARTHROPLASTY       Diagnosis: Knee osteoarthritis [M17.9]  Diagnosis Additional Information: No value filed.    Anesthesia Type:   Spinal     Note:    Oropharynx: oropharynx clear of all foreign objects  Level of Consciousness: awake  Oxygen Supplementation: room air    Independent Airway: airway patency satisfactory and stable    Vital Signs Stable: post-procedure vital signs reviewed and stable  Report to RN Given: handoff report given  Patient transferred to: PACU  Comments: Low Bps on arrival to PACU. Treated with IV ephedrine. RN to notify Dr Whaley for persistent hypotension  Handoff Report: Identifed the Patient, Identified the Reponsible Provider, Reviewed the pertinent medical history, Discussed the surgical course, Reviewed Intra-OP anesthesia mangement and issues during anesthesia, Set expectations for post-procedure period and Allowed opportunity for questions and acknowledgement of understanding      Vitals:  Vitals Value Taken Time   BP 96/55 09/11/24 1416   Temp 36  C (96.8  F) 09/11/24 1417   Pulse 61 09/11/24 1417   Resp 18 09/11/24 1417   SpO2 99 % 09/11/24 1417   Vitals shown include unfiled device data.    Electronically Signed By: MANDA Augustin CRNA  September 11, 2024  2:19 PM

## 2024-09-11 NOTE — ANESTHESIA PROCEDURE NOTES
"Adductor canal Procedure Note    Pre-Procedure   Staff -        Anesthesiologist:  Jono Whaley MD       Performed By: anesthesiologist       Location: pre-op       Procedure Start/Stop Times: 9/11/2024 12:20 PM and 9/11/2024 12:25 PM       Pre-Anesthestic Checklist: patient identified, IV checked, site marked, risks and benefits discussed, informed consent, monitors and equipment checked, pre-op evaluation, at physician/surgeon's request and post-op pain management  Timeout:       Correct Patient: Yes        Correct Procedure: Yes        Correct Site: Yes        Correct Position: Yes        Correct Laterality: Yes        Site Marked: Yes  Procedure Documentation  Procedure: Adductor canal       Laterality: left       Patient Position: supine       Patient Prep/Sterile Barriers: sterile gloves, mask       Skin prep: Chloraprep       Needle Gauge: 20.        Needle Length (Inches): 4        Ultrasound guided       1. Ultrasound was used to identify targeted nerve, plexus, vascular marker, or fascial plane and place a needle adjacent to it in real-time.       2. Ultrasound was used to visualize the spread of anesthetic in close proximity to the above referenced structure.       3. A permanent image is entered into the patient's record.       4. The visualized anatomic structures appeared normal.       5. There were no apparent abnormal pathologic findings.    Assessment/Narrative         The placement was negative for: blood aspirated, painful injection and site bleeding       Bolus given via needle..        Secured via.        Insertion/Infusion Method: Single Shot       Complications: none    Medication(s) Administered   Bupivacaine 0.5% PF (Infiltration) - Infiltration   20 mL - 9/11/2024 12:20:00 PM  Medication Administration Time: 9/11/2024 12:20 PM      FOR [facility] ([facility]) ONLY:   Pain Team Contact information: please page the Pain Team Via [employer]. Search \"Pain\". During daytime hours, please page the " attending first. At night please page the resident first.

## 2024-09-11 NOTE — BRIEF OP NOTE
[facility]    Brief Operative Note    Pre-operative diagnosis: Knee osteoarthritis [M17.9] left   Post-operative diagnosis Same as pre-operative diagnosis    Procedure: LEFT TOTAL KNEE ARTHROPLASTY, Left - Knee    Surgeon: Surgeons and Role:     * Miquel Palomo MD - Primary     * Di Blake PA-C - Assisting  Anesthesia: Spinal   Estimated Blood Loss: Less than 50 ml    Drains: None  Specimens: * No specimens in log *  Findings:   Grade 4 tricompartmental osteoarthritis .  Complications: None.  Implants:   Implant Name Type Inv. Item Serial No.  Lot No. LRB No. Used Action   SCREW GD 48MM ZUK HEX HD MIS STRL LF - [id number] [employer] SCREW GD 48MM ZUK HEX HD MIS STRL LF  [employer] [id number] Left 1 Used as a Supply   SCREW GD 48MM ZUK HEX HD MIS STRL LF - [id number] [employer] SCREW GD 48MM ZUK HEX HD MIS STRL LF  [employer] [id number] Left 1 Used as a Supply

## 2024-09-11 NOTE — TREATMENT PLAN
Orthopedic Surgery Pre-Op Plan: Vijay Fernandez  pre-op review. This is NOT an H&P   Surgeon:  [facility]  Name of Surgery: Left Total Knee Arthroplasty  Date of Surgery: 9/11/24  H&P: Completed on 8/16/24 by Dr. Brant Parkinson at [facility].   History of ASA, NSAIDS, vitamin and/or herbal supplements, GLP-1 Agonist or SGLT Inhibitor medication taken within 10 days?: Yes: On omega-3 fish oil, multivitamins-patient instructed to hold these vitamins and supplements for 7 days before surgery.  History of blood thinners?: No    Plan:   1) Discharge Plan: Home morning of POD 1 with assist of partner, Abeba. Please see Discharge Planning section near bottom of this note for further details.     2) Mild Cognitive Impairment: Follows with Neurology. On donepezil. Working with OT at [facility] recently on functional assessments.  At increased risk for confusion/postop delirium.  I recommend judicious use of pain medications, close monitoring and coordination with significant other and family.     3) Hypothyroidism: On levothyroxine.    4) Prostate Cancer with Metastasis to Bone: Follows with Oncology at [facility]. Diagnosis of prostate carcinoma made after biopsy of left iliac bone.  Tolerating apalutamide (Erleada) without complications. Will continue to follow closely with Oncology.     5) Benign Prostatic Hyperplasia (BPH) with urinary obstruction: Reports urinary symptoms have improved on tamsulosin.  At increased risk for postop urinary retention.  I recommend continuing on tamsulosin perioperatively and monitoring for urine retention with frequent bladder scanning as needed.    Patient appears medically optimized for upcoming surgery. I would recommend Hospitalist Consult to assist with medical management. Please call me below with any questions on this patient.       Review of Systems Notable for: Mild cognitive impairment, hypothyroidism, prostate cancer  with metastasis to bone-follows with oncology, benign prostatic hyperplasia.    Past Medical History:   Past Medical History:   Diagnosis Date    Arthritis     BPH with urinary obstruction     History of blood transfusion 1976    Hypothyroidism     Left-sided low back pain without sciatica     MCI (mild cognitive impairment)     Memory loss     Pelvic fracture (H) 1976    34 fractures in pelvis from crush injury    Prostate cancer metastatic to bone (H)      Past Surgical History:   Procedure Laterality Date    AS TOTAL KNEE ARTHROPLASTY Right 2005    TONSILLECTOMY & ADENOIDECTOMY         Current Medications:  Patient's Medications   New Prescriptions    No medications on file   Previous Medications    ACETAMINOPHEN (TYLENOL) 500 MG TABLET    Take 1,000 mg by mouth every 6 hours as needed for mild pain.    APALUTAMIDE (ERLEADA) 60 MG TABLET    Take 240 mg by mouth daily.    DONEPEZIL (ARICEPT) 10 MG TABLET    Take 15 mg by mouth at bedtime.    LEVOTHYROXINE (SYNTHROID/LEVOTHROID) 25 MCG TABLET    Take 25 mcg by mouth daily.    MULTIVITAMIN, THERAPEUTIC (THERA-VIT) TABS TABLET    Take 1 tablet by mouth daily. Stopped 8/30/24 before surgery    OMEGA-3-ACID ETHYL ESTERS (FISH OIL OMEGA-3 FATTY ACID) 320MG/ML ORAL SUSPENSION    Take 2,000 mg by mouth daily. Stopped 8/30/24 before surgery    OXYCODONE (ROXICODONE) 5 MG TABLET    Take 5 mg by mouth at bedtime.    TAMSULOSIN (FLOMAX) 0.4 MG CAPSULE    Take 0.4 mg by mouth daily.    VITAMIN B-COMPLEX    Take 1 tablet by mouth daily. Stopped 8/30/24 before surgery   Modified Medications    No medications on file   Discontinued Medications    HERBALS    Take 1 each by mouth daily. [employer]       ALLERGIES:  No Known Allergies    Social History  Social History     Tobacco Use    Smoking status: Never    Smokeless tobacco: Never   Substance Use Topics    Alcohol use: Yes     Comment: 3-7 drinks per week    Drug use: Never       Any Abnormal Recent  Diagnostics? Yes  WBC 3.0 at preop exam on 8/16/2024:  Should not affect proceeding with surgery at this level.  Recommend routine postop monitoring.     Discharge Planning:   Planning to discharge home on POD 1 in the morning with his partner helping him.       09/04/24 5590   Discharge Planning   Patient/Family Anticipates Transition to home with family  (outpatient PT arranged at [facility])   Concerns to be Addressed all concerns addressed in this encounter   Living Arrangements   People in Home significant other   Type of Residence Private Residence   Is your private residence a single family home or apartment? Single family home   Number of Stairs, Within Home, Primary greater than 10 stairs   Stair Railings, Within Home, Primary railings safe and in good condition   Once home, are you able to live on one level? No   Which rooms are not on the main level? Bedroom   Bathroom Shower/Tub Tub/Shower unit   Equipment Currently Used at Home grab bar, tub/shower  (Has a cane at home)   Support System   Support Systems Spouse/Significant Other  (significant other, Abeba)   Do you have someone available to stay with you one or two nights once you are home? Yes       MANDA Perez, CNP   Advanced Practice Nurse Navigator- Orthopedics  [facility]   Phone: [phone]

## 2024-09-12 ENCOUNTER — APPOINTMENT (OUTPATIENT)
Dept: PHYSICAL THERAPY | Facility: CLINIC | Age: 79
End: 2024-09-12
Attending: ORTHOPAEDIC SURGERY
Payer: MEDICARE

## 2024-09-12 ENCOUNTER — APPOINTMENT (OUTPATIENT)
Dept: OCCUPATIONAL THERAPY | Facility: CLINIC | Age: 79
End: 2024-09-12
Attending: ORTHOPAEDIC SURGERY
Payer: MEDICARE

## 2024-09-12 VITALS
HEART RATE: 62 BPM | TEMPERATURE: 98.4 F | HEIGHT: 69 IN | DIASTOLIC BLOOD PRESSURE: 60 MMHG | SYSTOLIC BLOOD PRESSURE: 137 MMHG | OXYGEN SATURATION: 96 % | WEIGHT: 200 LBS | RESPIRATION RATE: 18 BRPM | BODY MASS INDEX: 29.62 KG/M2

## 2024-09-12 LAB
FASTING STATUS PATIENT QL REPORTED: ABNORMAL
GLUCOSE SERPL-MCNC: 126 MG/DL (ref 70–99)
HGB BLD-MCNC: 10.2 G/DL (ref 13.3–17.7)
HGB BLD-MCNC: 9.3 G/DL (ref 13.3–17.7)

## 2024-09-12 PROCEDURE — 36415 COLL VENOUS BLD VENIPUNCTURE: CPT | Performed by: ORTHOPAEDIC SURGERY

## 2024-09-12 PROCEDURE — 97535 SELF CARE MNGMENT TRAINING: CPT | Mod: GO

## 2024-09-12 PROCEDURE — 250N000011 HC RX IP 250 OP 636: Mod: JZ | Performed by: ORTHOPAEDIC SURGERY

## 2024-09-12 PROCEDURE — 97116 GAIT TRAINING THERAPY: CPT | Mod: GP

## 2024-09-12 PROCEDURE — 97110 THERAPEUTIC EXERCISES: CPT | Mod: GP

## 2024-09-12 PROCEDURE — 97166 OT EVAL MOD COMPLEX 45 MIN: CPT | Mod: GO

## 2024-09-12 PROCEDURE — 250N000013 HC RX MED GY IP 250 OP 250 PS 637: Performed by: ORTHOPAEDIC SURGERY

## 2024-09-12 PROCEDURE — 99285 EMERGENCY DEPT VISIT HI MDM: CPT | Mod: 25

## 2024-09-12 PROCEDURE — 36415 COLL VENOUS BLD VENIPUNCTURE: CPT | Performed by: NURSE PRACTITIONER

## 2024-09-12 PROCEDURE — 85018 HEMOGLOBIN: CPT | Performed by: NURSE PRACTITIONER

## 2024-09-12 PROCEDURE — 82947 ASSAY GLUCOSE BLOOD QUANT: CPT | Performed by: ORTHOPAEDIC SURGERY

## 2024-09-12 PROCEDURE — 97161 PT EVAL LOW COMPLEX 20 MIN: CPT | Mod: GP

## 2024-09-12 PROCEDURE — 85018 HEMOGLOBIN: CPT | Performed by: ORTHOPAEDIC SURGERY

## 2024-09-12 RX ORDER — OXYCODONE HYDROCHLORIDE 5 MG/1
5 TABLET ORAL AT BEDTIME
Qty: 25 TABLET | Refills: 0 | Status: SHIPPED | OUTPATIENT
Start: 2024-09-12

## 2024-09-12 RX ADMIN — CEFAZOLIN SODIUM 2 G: 2 INJECTION, SOLUTION INTRAVENOUS at 03:41

## 2024-09-12 RX ADMIN — ASPIRIN 81 MG: 81 TABLET, COATED ORAL at 08:04

## 2024-09-12 RX ADMIN — OXYCODONE HYDROCHLORIDE 10 MG: 5 TABLET ORAL at 06:10

## 2024-09-12 RX ADMIN — ACETAMINOPHEN 975 MG: 325 TABLET ORAL at 11:15

## 2024-09-12 RX ADMIN — SENNOSIDES AND DOCUSATE SODIUM 1 TABLET: 50; 8.6 TABLET ORAL at 08:04

## 2024-09-12 RX ADMIN — LEVOTHYROXINE SODIUM 25 MCG: 25 TABLET ORAL at 06:12

## 2024-09-12 RX ADMIN — TAMSULOSIN HYDROCHLORIDE 0.4 MG: 0.4 CAPSULE ORAL at 08:04

## 2024-09-12 RX ADMIN — ACETAMINOPHEN 975 MG: 325 TABLET ORAL at 03:40

## 2024-09-12 RX ADMIN — POLYETHYLENE GLYCOL 3350 17 G: 17 POWDER, FOR SOLUTION ORAL at 08:04

## 2024-09-12 RX ADMIN — OXYCODONE HYDROCHLORIDE 5 MG: 5 TABLET ORAL at 01:18

## 2024-09-12 ASSESSMENT — ACTIVITIES OF DAILY LIVING (ADL)
ADLS_ACUITY_SCORE: 25
ADLS_ACUITY_SCORE: 27
ADLS_ACUITY_SCORE: 27
ADLS_ACUITY_SCORE: 25
ADLS_ACUITY_SCORE: 27
ADLS_ACUITY_SCORE: 25
ADLS_ACUITY_SCORE: 27
ADLS_ACUITY_SCORE: 25
ADLS_ACUITY_SCORE: 25
ADLS_ACUITY_SCORE: 27
ADLS_ACUITY_SCORE: 25
DEPENDENT_IADLS:: INDEPENDENT

## 2024-09-12 ASSESSMENT — COLUMBIA-SUICIDE SEVERITY RATING SCALE - C-SSRS
2. HAVE YOU ACTUALLY HAD ANY THOUGHTS OF KILLING YOURSELF IN THE PAST MONTH?: NO
1. IN THE PAST MONTH, HAVE YOU WISHED YOU WERE DEAD OR WISHED YOU COULD GO TO SLEEP AND NOT WAKE UP?: NO
6. HAVE YOU EVER DONE ANYTHING, STARTED TO DO ANYTHING, OR PREPARED TO DO ANYTHING TO END YOUR LIFE?: NO

## 2024-09-12 NOTE — PLAN OF CARE
"Goal Outcome Evaluation:                        Problem: Adult Inpatient Plan of Care  Goal: Optimal Comfort and Wellbeing  Outcome: Progressing   /73 (BP Location: Left arm)   Pulse 63   Temp 98.4  F (36.9  C) (Oral)   Resp 16   Ht 1.753 m (5' 9\")   Wt 90.7 kg (200 lb)   SpO2 97%   BMI 29.53 kg/m      Patient vital signs are at baseline: Yes  Patient able to ambulate as they were prior to admission or with assist devices provided by therapies during their stay:  Yes  Patient MUST void prior to discharge:  Yes  Patient able to tolerate oral intake:  Yes  Pain has adequate pain control using Oral analgesics:  Yes  Does patient have an identified :  Yes  Has goal D/C date and time been discussed with patient:  Yes    Toney Valenzuela RN      "

## 2024-09-12 NOTE — DISCHARGE SUMMARY
[facility] Orthopedics Discharge Summary                                  [facility]     HUDSON GARCIA [id number]   Age: 79 year old  PCP: Brant Parkinson, [phone] 1945     Date of Admission:  9/11/2024  Date of Discharge::  9/12/2024  Discharge Provider:  MANDA Frias CNP    Code status:  Full Code    Admission Information:  Admission Diagnosis:  Knee osteoarthritis [M17.9]    Post-Operative Day: 1 Day Post-Op     Reason for admission:  The patient was admitted for the following:Procedure(s) (LRB):  LEFT TOTAL KNEE ARTHROPLASTY (Left)    Active Problems:    History of total knee arthroplasty, left      Allergies:  Patient has no known allergies.    Following the procedure noted above the patient was transferred to the post-op floor and started on:    Therapy:  physical therapy and occupational therapy  Anticoagulation Plan: Aspirin 81 mg BID  for 42 days  Pain Management: scopainmedication: oxycodone and tylenol  Weight bearing status: Weight bearing as tolerated     The patient was followed by Orthopedics during the inpatient treatment course:  Complications:  None  Additional consultations:  None     Pertinent Labs   Lab Results: personally reviewed.     Recent Labs   Lab Test 09/12/24  1009 09/12/24  0415 01/20/21  2133   INR  --   --  1.07   WBC  --   --  10.5   HGB 10.2* 9.3* 13.2*   HCT  --   --  39.5*   MCV  --   --  89   PLT  --   --  192          Discharge Information:  Condition at discharge: Stable  Discharge destination:  Discharged to home     Medications at discharge:  Current Discharge Medication List        START taking these medications    Details   aspirin 81 MG EC tablet Take 1 tablet (81 mg) by mouth 2 times daily.  Qty: 84 tablet, Refills: 0    Associated Diagnoses: S/P total knee arthroplasty, left      senna-docusate (SENOKOT-S/PERICOLACE) 8.6-50 MG tablet Take 1 tablet by mouth 2 times daily as needed for constipation.  Qty: 42 tablet, Refills: 0     Associated Diagnoses: S/P total knee arthroplasty, left           CONTINUE these medications which have CHANGED    Details   oxyCODONE (ROXICODONE) 5 MG tablet Take 1 tablet (5 mg) by mouth at bedtime.  Qty: 25 tablet, Refills: 0    Associated Diagnoses: S/P total knee arthroplasty, left           CONTINUE these medications which have NOT CHANGED    Details   acetaminophen (TYLENOL) 500 MG tablet Take 1,000 mg by mouth every 6 hours as needed for mild pain.      apalutamide (ERLEADA) 60 MG tablet Take 240 mg by mouth daily.      donepezil (ARICEPT) 10 MG tablet Take 15 mg by mouth at bedtime.      levothyroxine (SYNTHROID/LEVOTHROID) 25 MCG tablet Take 25 mcg by mouth daily.      multivitamin, therapeutic (THERA-VIT) TABS tablet Take 1 tablet by mouth daily.      Omega-3-acid Ethyl Esters (FISH OIL OMEGA-3 FATTY ACID) 320MG/ML oral suspension Take 1,000 mg by mouth daily.      tamsulosin (FLOMAX) 0.4 MG capsule Take 0.4 mg by mouth daily.      vitamin B-Complex Take 1 tablet by mouth daily.                        Follow-Up Care:  Patient should be seen in the office in 14 days by the Orthopedic Surgeon/Physician Assistant.  Call [phone] for appointment or questions.    It was my pleasure to take care of the above patient.  MANDA Frias CNP

## 2024-09-12 NOTE — PLAN OF CARE
Goal Outcome Evaluation:         Patient vital signs are at baseline: Yes  Patient able to ambulate as they were prior to admission or with assist devices provided by therapies during their stay:  No,  Reason:  pt due to amb this shift  Patient MUST void prior to discharge:  No,  Reason:  Pt due to void  Patient able to tolerate oral intake:  Yes  Pain has adequate pain control using Oral analgesics:  Yes  Does patient have an identified :  Yes  Has goal D/C date and time been discussed with patient:  Yes

## 2024-09-12 NOTE — PROVIDER NOTIFICATION
At 1930, pt urgently need to void and set off bed alarm. Pt was very frustrated and attached to SCD pumps and IV pole and pulse oximetry. He jumped out bed and bore full weight on his surgical leg. He was assisted to the bathroom with walker and gait belt. Returning to bed he was limping and unsteady.    Pt denied that he was in pain and adamantly refused pain medications. Around 2300 pt knee dressing was found to be saturated with blood. Abd pad, new ace wrap and ice applied to reinforce.    Notified Dr Wicho Benavides DO who recommended pressure dressing and to continue to monitor. Hgb check already ordered for SADIE Valenzuela RN

## 2024-09-12 NOTE — PROGRESS NOTES
"[facility] Orthopaedics Progress Note      Post-operative Day: 1 Day Post-Op    Procedure(s):  LEFT TOTAL KNEE ARTHROPLASTY      Subjective: Patient seen resting at edge of bed eating breakfast. Eager to discharge home. Noted to have some increased drainage to L knee with pressure dressing in place. Reports pain tolerable on PO analgesics. Tolerating a regular diet with no nausea or vomiting. Reports voiding without difficulty and passing gas.     Pain: moderate  Chest pain, SOB:  No    Objective:  Blood pressure 137/60, pulse 62, temperature 98.4  F (36.9  C), temperature source Oral, resp. rate 18, height 1.753 m (5' 9\"), weight 90.7 kg (200 lb), SpO2 96%.    Patient Vitals for the past 24 hrs:   BP Temp Temp src Pulse Resp SpO2   09/12/24 0737 137/60 98.4  F (36.9  C) Oral 62 18 96 %   09/12/24 0348 135/73 98.4  F (36.9  C) Oral 63 16 97 %   09/11/24 2330 131/65 98.2  F (36.8  C) Oral 70 18 95 %   09/11/24 1930 133/72 97.9  F (36.6  C) Oral 58 18 94 %   09/11/24 1830 126/58 97.8  F (36.6  C) Oral 71 18 94 %   09/11/24 1730 (!) 160/80 97.3  F (36.3  C) Oral 51 18 93 %   09/11/24 1700 (!) 124/98 97.4  F (36.3  C) Axillary 71 16 94 %   09/11/24 1655 -- -- -- -- -- 96 %   09/11/24 1630 (!) 169/81 97.6  F (36.4  C) Axillary 68 16 94 %   09/11/24 1600 127/71 97.5  F (36.4  C) Temporal 57 12 96 %   09/11/24 1530 128/59 -- -- (!) 49 -- 98 %   09/11/24 1500 116/56 96.8  F (36  C) -- 52 13 97 %   09/11/24 1450 106/59 96.8  F (36  C) -- 54 16 96 %   09/11/24 1440 100/54 -- -- 57 16 97 %   09/11/24 1430 107/57 96.8  F (36  C) -- 64 14 95 %   09/11/24 1422 105/68 96.8  F (36  C) -- 62 19 95 %   09/11/24 1420 -- -- -- -- -- 97 %   09/11/24 1416 96/55 96.8  F (36  C) -- 62 21 --   09/11/24 1415 (!) 85/48 97  F (36.1  C) Core 57 23 95 %   09/11/24 1414 (!) 83/45 -- -- 58 17 --   09/11/24 1406 (!) 76/40 96.8  F (36  C) Temporal 52 20 99 %   09/11/24 1245 132/70 (!) 96.1  F (35.6  C) -- (!) 49 22 95 %   09/11/24 1234 133/68 97.2 "  F (36.2  C) -- 50 11 94 %   09/11/24 1232 133/68 97.5  F (36.4  C) -- 50 13 97 %   09/11/24 1231 -- 97.2  F (36.2  C) -- 50 23 96 %   09/11/24 1230 (!) 142/69 97.3  F (36.3  C) -- 51 16 93 %   09/11/24 1225 (!) 140/64 97.3  F (36.3  C) -- 51 18 93 %   09/11/24 1221 (!) 140/64 97.3  F (36.3  C) -- 54 15 96 %   09/11/24 1219 (!) 140/64 97.5  F (36.4  C) -- 52 21 96 %   09/11/24 1132 (!) 149/73 97.2  F (36.2  C) Core 55 16 97 %       Wt Readings from Last 4 Encounters:   09/04/24 90.7 kg (200 lb)       General: Alert and orientated, NAD  Respiratory: Non-labored breathing on RA  LLE motor function, sensation, and circulation intact   Yes, Dorsiflexion/plantarflexion intact and equal bilaterally. Moves all other extremities with ease and purpose   Wound status: incisions are clean dry and intact. No Bloody drainage covering greater then 90% of dressing. No erythema or ecchymosis.   Calf tenderness: Bilateral  No    Pertinent Labs   Lab Results: personally reviewed.     Recent Labs   Lab Test 09/12/24  0415 01/20/21  2133   INR  --  1.07   WBC  --  10.5   HGB 9.3* 13.2*   HCT  --  39.5*   MCV  --  89   PLT  --  192       Plan:   ABLA- Hgb 9.3 down from 13.2, recheck 10.2. Remains HD stable and asymptomatic.   Replace dressing this AM and monitor for ongoing drainage  AUR- Required SIC last evening. PTA Flomax resumed. Pt reports voiding this AM. Recommend PVR prior to discharge to ensure adequately able to empty bladder.   Anticoagulation protocol: Aspirin 81 mg BID  x 42  days  Pain medications:  Multimodal approach with ice, oxycodone and tylenol  Weight bearing status:  WBAT  Disposition:  home pending therapies    Continue cares and rehabilitation     Report completed by:  MANDA Frias CNP  Date: 9/12/2024  Time: 10:12 AM

## 2024-09-12 NOTE — PROGRESS NOTES
Occupational Therapy Discharge Summary    Reason for therapy discharge:    Discharged to home.    Progress towards therapy goal(s). See goals on Care Plan in [facility] electronic health record for goal details.  Goals met    Therapy recommendation(s):    No further therapy is recommended.

## 2024-09-12 NOTE — PROGRESS NOTES
09/12/24 1025   Appointment Info   Signing Clinician's Name / Credentials (OT) MAYELA Blanco/L, CLT   Rehab Comments (OT) OT sanaz   Quick Adds   Quick Adds Certification   Living Environment   People in Home significant other   Current Living Arrangements house   Self-Care   Usual Activity Tolerance good   Current Activity Tolerance moderate   Equipment Currently Used at Home grab bar, tub/shower  (can get bench)   Activity/Exercise/Self-Care Comment Pt independent w/ ADL and IADL at baseline. Pt works as a .   General Information   Onset of Illness/Injury or Date of Surgery 09/11/24   Referring Physician Dr. Palomo   Patient/Family Therapy Goal Statement (OT) wants to go home   Additional Occupational Profile Info/Pertinent History of Current Problem TKA   Existing Precautions/Restrictions weight bearing   Left Lower Extremity (Weight-bearing Status) weight-bearing as tolerated (WBAT)   Cognitive Status Examination   Affect/Mental Status (Cognitive) confused   Cognitive Status Comments (S)  At times confused and impulsive. Decreased problem solving and insight into deficits. Pt did mention some memory difficulties, however. Significant Other appears to assist with safety well.   Visual Perception   Visual Impairment/Limitations WFL   Sensory   Sensory Quick Adds sensation intact   Pain Assessment   Patient Currently in Pain No   Posture   Posture not impaired   Range of Motion Comprehensive   General Range of Motion no range of motion deficits identified   Strength Comprehensive (MMT)   General Manual Muscle Testing (MMT) Assessment no strength deficits identified   Muscle Tone Assessment   Muscle Tone Quick Adds No deficits were identified   Coordination   Upper Extremity Coordination No deficits were identified   Bed Mobility   Bed Mobility supine-sit;sit-supine   Comment (Bed Mobility) SBA-CGA   Transfers   Transfers bed-chair transfer;sit-stand transfer;toilet transfer;shower transfer    Transfer Comments [facility]   Transfer Skill: Bed to Chair/Chair to Bed   Transfer Comments SBA-CGA   Sit-Stand Transfer   Sit/Stand Transfer Comments SB-CGA   Shower Transfer   Shower Transfer Comments SBA-CGA   Toilet Transfer   Toilet Transfer Comments [facility]   Balance   Balance Comments decreased   Clinical Impression   Criteria for Skilled Therapeutic Interventions Met (OT) Yes, treatment indicated   OT Diagnosis decreased ADLs   Influenced by the following impairments TKA   OT Problem List-Impairments impacting ADL activity tolerance impaired;pain;mobility   Assessment of Occupational Performance 3-5 Performance Deficits   Identified Performance Deficits dressing, bathing, functional mobility, toileting   Planned Therapy Interventions (OT) ADL retraining;bed mobility training;transfer training   Clinical Decision Making Complexity (OT) detailed assessment/moderate complexity   Risk & Benefits of therapy have been explained evaluation/treatment results reviewed;patient   OT Total Evaluation Time   OT Eval, Moderate Complexity Minutes (76105) 10   Therapy Certification   Medical Diagnosis TKA   Start of Care Date 09/12/24   Certification date from 09/12/24   Certification date to 10/12/24   OT Goals   Therapy Frequency (OT) One time eval and treatment   OT Predicted Duration/Target Date for Goal Attainment 09/12/24   OT Goals Bed Mobility;Transfers   OT: Bed Mobility Modified independent;within precautions;Goal Met;Completed   OT: Transfer Goal Met;Completed;within precautions;Supervision/stand-by assist  (for tub transfer)   Interventions   Interventions Quick Adds Self-Care/Home Management   Self-Care/Home Management   Self-Care/Home Mgmt/ADL, Compensatory, Meal Prep Minutes (73649) 30   Symptoms Noted During/After Treatment (Meal Preparation/Planning Training) fatigue   Treatment Detail/Skilled Intervention Pt was dressed and stated SO would assist PRN. STS w/ SBA and amb. ~15 ft to BR w/ FWW, pain w/  mobility. Pt edu on safety technique for toilet/tub- shower transfer - completed each Mod I after multiple reps, ludwin for tub transfer with bench. Pt instructed on bed mobility techniques - completed Mod I, after reps for safety and edu/teach back to this therapist on safety. Pt edu on sleeping position and car transfers - pt and SO verbalized understanding.   OT Discharge Planning   OT Plan OT d/c   OT Discharge Recommendation (DC Rec) other (see comments)  (defer to ortho)   OT Rationale for DC Rec Pt is SBA d/t impulsivity, but SO appears to be able to manage this fairly well.   OT Brief overview of current status SBA for BADL/func mob.   OT Equipment Needed at Discharge (S)  shower chair   Total Session Time   Timed Code Treatment Minutes 30   Total Session Time (sum of timed and untimed services) 40    [facility]  OUTPATIENT OCCUPATIONAL THERAPY  EVALUATION  PLAN OF TREATMENT FOR OUTPATIENT REHABILITATION  (COMPLETE FOR INITIAL CLAIMS ONLY)  Patient's Last Name, First Name, M.I.  YOB: 1945  Vijay Fernandez                          Provider's Name  [facility] Medical Record No.  [id number]                             Onset Date:  09/11/24   Start of Care Date:  09/12/24   Type:     ___PT   _X_OT   ___SLP Medical Diagnosis:  TKA                    OT Diagnosis:  decreased ADLs Visits from SOC:  1     See note for plan of treatment, functional goals and certification details    I CERTIFY THE NEED FOR THESE SERVICES FURNISHED UNDER        THIS PLAN OF TREATMENT AND WHILE UNDER MY CARE     (Physician co-signature of this document indicates review and certification of the therapy plan).

## 2024-09-12 NOTE — PROGRESS NOTES
09/12/24 0933   Appointment Info   Signing Clinician's Name / Credentials (PT) Brant Stevens, LUCRECIA   Quick Adds   Quick Adds Certification   Living Environment   People in Home significant other   Current Living Arrangements house   Home Accessibility stairs to enter home;stairs within home   Number of Stairs, Main Entrance 3   Stair Railings, Main Entrance railings safe and in good condition   Number of Stairs, Within Home, Primary greater than 10 stairs   Stair Railings, Within Home, Primary railings safe and in good condition   Living Environment Comments can stay on main level of home per patient   Self-Care   Usual Activity Tolerance good   Current Activity Tolerance good   Equipment Currently Used at Home grab bar, tub/shower   Fall history within last six months no   Activity/Exercise/Self-Care Comment Independent prior for all I ADL's and ADL's  per patient   General Information   Onset of Illness/Injury or Date of Surgery 09/12/24   Pertinent History of Current Problem (include personal factors and/or comorbidities that impact the POC) L TKA   Weight-Bearing Status - LLE weight-bearing as tolerated   Cognition   Affect/Mental Status (Cognition) WFL   Range of Motion (ROM)   ROM Comment decreased ROM s/p L TKA   Strength (Manual Muscle Testing)   Strength (Manual Muscle Testing) No deficits observed during functional mobility   Transfers   Transfers sit-stand transfer   Sit-Stand Transfer   Sit-Stand [place] (Transfers) contact guard;verbal cues   Assistive Device (Sit-Stand Transfers) walker, front-wheeled   Gait/Stairs (Locomotion)   [place] Level (Gait) contact guard;verbal cues   Assistive Device (Gait) walker, front-wheeled   Distance in Feet (Gait) 20   Balance   Balance no deficits were identified   Clinical Impression   Criteria for Skilled Therapeutic Intervention Yes, treatment indicated   PT Diagnosis (PT) impaired functional mobility   Influenced by the following impairments pain,  decreased ROM, impaired balance, decreased strength   Functional limitations due to impairments gait, stairs, transfers   Clinical Presentation (PT Evaluation Complexity) stable   Clinical Presentation Rationale pt presents as medically diagnosed   Clinical Decision Making (Complexity) low complexity   Planned Therapy Interventions (PT) gait training;home exercise program;patient/family education;stair training;transfer training   Risk & Benefits of therapy have been explained care plan/treatment goals reviewed;patient   PT Total Evaluation Time   PT Eval, Low Complexity Minutes (99659) 10   Therapy Certification   Start of care date 09/12/24   Certification date from 09/12/24   Certification date to 10/12/24   Physical Therapy Goals   PT Frequency One time eval and treatment only   PT Predicted Duration/Target Date for Goal Attainment 09/14/24   PT Goals PT Goal 1;Transfers;Gait;Stairs   PT: Transfers Supervision/stand-by assist;Maximum assist;Assistive device;Within precautions;Goal Met   PT: Gait Supervision/stand-by assist;Rolling walker;Greater than 200 feet;Goal Met;Within precautions   PT: Stairs Supervision/stand-by assist;Rail on right;4 stairs;Within precautions;Goal Met   PT: Goal 1 Able to perform written  HEP with cueing   Interventions   Interventions Quick Adds Therapeutic Procedure;Therapeutic Activity;Gait Training   Therapeutic Procedure/Exercise   Ther. Procedure: strength, endurance, ROM, flexibillity Minutes (33211) 22   Symptoms Noted During/After Treatment none   Treatment Detail/Skilled Intervention TKA protocol therex x10 reps, cueing for technique,  went through hep initially with just patient and then reviewed and had patient perform again with 5 x reps when significant other arrived   Therapeutic Activity   Treatment Detail/Skilled Intervention sit to/from stand, cueing for safe hand placement and LE positioning, Mod I following education   Gait Training   Gait Training Minutes (50055) 18    Symptoms Noted During/After Treatment (Gait Training) none   Treatment Detail/Skilled Intervention Patient moves very rapidly and impulsively.  Can slow down with cueing but appears to forget very quickly. Aubree did not lose balance at any time.  Per significant other has not fallen.   Patient cued for non reciprocal steps and for use of cane on stairs. Patient up/down 4 step bilat rail, 8 steps with R rail and spc.  Patient needing vc to stay with walker and slow down   Distance in Feet 240, 120,140   [place] Level (Gait Training) stand-by assist   Physical Assistance Level (Gait Training) supervision;verbal cues;1 person assist   Weight Bearing (Gait Training) weight-bearing as tolerated   Assistive Device (Gait Training) rolling walker   Pattern Analysis (Gait Training) swing-through gait   Impairments (Gait Analysis/Training) pain;strength decreased   Stair Railings present on right side;present at both sides   Physical Assist/Nonphysical Assist (Stairs) 1 person assist;verbal cues;supervision   Level of [place] (Stairs) contact guard   Assistive Device (Stairs) straight cane   PT Discharge Planning   PT Plan dc PT   PT Discharge Recommendation (DC Rec) other (see comments)  (per ortho MD)   PT Rationale for DC Rec Patient moving well, has dementia and is impulsive with movement.   Encourage supervision and for patient to slow down and always use FWW when ambulating.   Aubree has good home support and good home setup   PT Brief overview of current status Patient SBA for gait/transfers   PT Equipment Needed at Discharge cane, straight;walker, rolling   Total Session Time   Timed Code Treatment Minutes 40   Total Session Time (sum of timed and untimed services) 50   [facility]  OUTPATIENT PHYSICAL THERAPY EVALUATION  PLAN OF TREATMENT FOR OUTPATIENT REHABILITATION  (COMPLETE FOR INITIAL CLAIMS ONLY)  Patient's Last Name, First Name, M.I.  Date of Birth:   1945  Vijay Fernandez                        Provider's Name  [facility] Medical Record No.  [id number]                             Onset Date:  09/12/24   Start of Care Date:  09/12/24   Type:     _X_PT   ___OT   ___SLP Medical Diagnosis:                 PT Diagnosis:  impaired functional mobility Visits from SOC:  1     See note for plan of treatment, functional goals and certification details    I CERTIFY THE NEED FOR THESE SERVICES FURNISHED UNDER        THIS PLAN OF TREATMENT AND WHILE UNDER MY CARE     (Physician co-signature of this document indicates review and certification of the therapy plan).

## 2024-09-12 NOTE — CONSULTS
Care Management Initial Consult    General Information  Assessment completed with: Patient, Spouse or significant other,    Type of CM/SW Visit: Initial Assessment    Primary Care Provider verified and updated as needed: Yes   Readmission within the last 30 days: no previous admission in last 30 days      Reason for Consult: discharge planning  Advance Care Planning:            Communication Assessment  Patient's communication style: spoken language (English or Bilingual)    Hearing Difficulty or Deaf: no   Wear Glasses or Blind: no    Cognitive  Cognitive/Neuro/Behavioral: WDL                      Living Environment:   People in home: significant other     Current living Arrangements: house      Able to return to prior arrangements: yes       Family/Social Support:  Care provided by: child(maricruz)  Provides care for: no one  Marital Status: Lives with Significant Other  Support system: Significant Other, Children          Description of Support System: Supportive, Involved         Current Resources:   Patient receiving home care services: No        Community Resources: None  Equipment currently used at home: grab bar, tub/shower (can get bench)  Supplies currently used at home: None    Employment/Financial:  Employment Status: employed part-time        Financial Concerns: none   Referral to Financial Worker: No       Does the patient's insurance plan have a 3 day qualifying hospital stay waiver?  No    Lifestyle & Psychosocial Needs:  Social Determinants of Health     Food Insecurity: Low Risk  (9/12/2024)    Food Insecurity     Within the past 12 months, did you worry that your food would run out before you got money to buy more?: No     Within the past 12 months, did the food you bought just not last and you didn t have money to get more?: No   Depression: Not at risk (11/6/2023)    Received from [facility]    PHQ-2     PHQ-2 Score: 0   Housing Stability: Low Risk  (9/12/2024)    Housing Stability     Do you  have housing? : Yes     Are you worried about losing your housing?: No   Tobacco Use: Low Risk  (9/11/2024)    Patient History     Smoking Tobacco Use: Never     Smokeless Tobacco Use: Never     Passive Exposure: Not on file   Financial Resource Strain: Low Risk  (9/12/2024)    Financial Resource Strain     Within the past 12 months, have you or your family members you live with been unable to get utilities (heat, electricity) when it was really needed?: No   Alcohol Use: Not on file   Transportation Needs: Low Risk  (9/12/2024)    Transportation Needs     Within the past 12 months, has lack of transportation kept you from medical appointments, getting your medicines, non-medical meetings or appointments, work, or from getting things that you need?: No   Physical Activity: Not on file   Interpersonal Safety: Low Risk  (9/11/2024)    Interpersonal Safety     Do you feel physically and emotionally safe where you currently live?: Yes     Within the past 12 months, have you been hit, slapped, kicked or otherwise physically hurt by someone?: No     Within the past 12 months, have you been humiliated or emotionally abused in other ways by your partner or ex-partner?: No   Stress: Not on file   Social Connections: Unknown (1/1/2022)    Received from [employer] [facility]    Social Connections     Frequency of Communication with Friends and Family: Not on file   Health Literacy: Not on file       Functional Status:  Prior to admission patient needed assistance:   Dependent ADLs:: Independent  Dependent IADLs:: Independent       Mental Health Status:  Mental Health Status: No Current Concerns       Chemical Dependency Status:  Chemical Dependency Status: No Current Concerns             Values/Beliefs:  Spiritual, Cultural Beliefs, Shinto Practices, Values that affect care: no               Discussed  Partnership in Safe Discharge Planning  document with patient/family: Yes:         Care Management  Discharge Note    Discharge Date: 09/12/2024       Discharge Disposition: Home, Outpatient Rehab (PT, OT, SLP, Cardiac or Pulmonary)    Discharge Services: None    Discharge DME: None    Discharge Transportation: family or friend will provide    Private pay costs discussed: Not applicable    Does the patient's insurance plan have a 3 day qualifying hospital stay waiver?  No    PAS Confirmation Code:    Patient/family educated on Medicare website which has current facility and service quality ratings: no    Education Provided on the Discharge Plan: Yes  Persons Notified of Discharge Plans: Pt, SO and Dtr   Patient/Family in Agreement with the Plan: yes    Handoff Referral Completed: No, handoff not indicated or clinically appropriate    Additional Information:  [facility] met and introduced self and CM services to Pt and Significant Other- Abeba who was visiting. Pt independent at baseline.  Pt works as a part time  and dries. Abeba expressed concerns  about Pt not listening to her and fear that he may fall.  Pt not able to fully understand Abeba's concerns and would get upset with Abeba. Pt would most likely not qualify for TCU as he has no 3 day stay and doing well in therapy. Pt did not want to pay privately.  Abeba asked that [facility] call his daughter- Crissy (Family Practice MD) [phone].  Crissy states that they have had relationship issues and for years and this is not new.  Crissy said that she would  Pt after work today at about 4:40 PM and take him to her house.  Pt was planning to spend the weekend there as Abeba is going to the cabin.      Abeba ended up taking Pt home.  [facility] called Crissy and she is aware that Abeba took Pt home and will pick Pt up in [place].  Pt has Out Pt Therapy scheduled in [place].     CARMELITA Allan

## 2024-09-13 ENCOUNTER — APPOINTMENT (OUTPATIENT)
Dept: PHYSICAL THERAPY | Facility: CLINIC | Age: 79
End: 2024-09-13
Attending: HOSPITALIST
Payer: MEDICARE

## 2024-09-13 ENCOUNTER — APPOINTMENT (OUTPATIENT)
Dept: RADIOLOGY | Facility: CLINIC | Age: 79
End: 2024-09-13
Attending: EMERGENCY MEDICINE
Payer: MEDICARE

## 2024-09-13 ENCOUNTER — HOSPITAL ENCOUNTER (OUTPATIENT)
Facility: CLINIC | Age: 79
Setting detail: OBSERVATION
Discharge: HOME | End: 2024-09-14
Attending: EMERGENCY MEDICINE | Admitting: HOSPITALIST
Payer: MEDICARE

## 2024-09-13 DIAGNOSIS — Z86.59 HISTORY OF DEMENTIA: ICD-10-CM

## 2024-09-13 DIAGNOSIS — Z96.652 STATUS POST TOTAL LEFT KNEE REPLACEMENT: ICD-10-CM

## 2024-09-13 PROBLEM — H81.10 BPPV (BENIGN PAROXYSMAL POSITIONAL VERTIGO): Status: ACTIVE | Noted: 2024-09-13

## 2024-09-13 PROBLEM — N13.8 BENIGN PROSTATIC HYPERPLASIA WITH URINARY OBSTRUCTION: Status: ACTIVE | Noted: 2023-11-06

## 2024-09-13 PROBLEM — C61 PROSTATE CANCER METASTATIC TO BONE (H): Status: ACTIVE | Noted: 2023-10-25

## 2024-09-13 PROBLEM — N40.1 BENIGN PROSTATIC HYPERPLASIA WITH URINARY OBSTRUCTION: Status: ACTIVE | Noted: 2023-11-06

## 2024-09-13 PROBLEM — E78.00 HYPERCHOLESTEROLEMIA: Status: ACTIVE | Noted: 2024-09-13

## 2024-09-13 PROBLEM — C79.51 PROSTATE CANCER METASTATIC TO BONE (H): Status: ACTIVE | Noted: 2023-10-25

## 2024-09-13 LAB
ALBUMIN UR-MCNC: 30 MG/DL
ANION GAP SERPL CALCULATED.3IONS-SCNC: 14 MMOL/L (ref 7–15)
APPEARANCE UR: CLEAR
BASOPHILS # BLD AUTO: 0 10E3/UL (ref 0–0.2)
BASOPHILS NFR BLD AUTO: 0 %
BILIRUB UR QL STRIP: NEGATIVE
BUN SERPL-MCNC: 13 MG/DL (ref 8–23)
CALCIUM SERPL-MCNC: 8.5 MG/DL (ref 8.8–10.4)
CHLORIDE SERPL-SCNC: 105 MMOL/L (ref 98–107)
COLOR UR AUTO: YELLOW
CREAT SERPL-MCNC: 0.93 MG/DL (ref 0.67–1.17)
CRP SERPL-MCNC: 158 MG/L
EGFRCR SERPLBLD CKD-EPI 2021: 84 ML/MIN/1.73M2
EOSINOPHIL # BLD AUTO: 0 10E3/UL (ref 0–0.7)
EOSINOPHIL NFR BLD AUTO: 0 %
ERYTHROCYTE [DISTWIDTH] IN BLOOD BY AUTOMATED COUNT: 13.6 % (ref 10–15)
GLUCOSE SERPL-MCNC: 127 MG/DL (ref 70–99)
GLUCOSE UR STRIP-MCNC: NEGATIVE MG/DL
HCO3 SERPL-SCNC: 24 MMOL/L (ref 22–29)
HCT VFR BLD AUTO: 31.9 % (ref 40–53)
HGB BLD-MCNC: 10.8 G/DL (ref 13.3–17.7)
HGB UR QL STRIP: NEGATIVE
IMM GRANULOCYTES # BLD: 0 10E3/UL
IMM GRANULOCYTES NFR BLD: 0 %
KETONES UR STRIP-MCNC: 10 MG/DL
LEUKOCYTE ESTERASE UR QL STRIP: NEGATIVE
LYMPHOCYTES # BLD AUTO: 0.6 10E3/UL (ref 0.8–5.3)
LYMPHOCYTES NFR BLD AUTO: 9 %
MCH RBC QN AUTO: 30.3 PG (ref 26.5–33)
MCHC RBC AUTO-ENTMCNC: 33.9 G/DL (ref 31.5–36.5)
MCV RBC AUTO: 90 FL (ref 78–100)
MONOCYTES # BLD AUTO: 0.8 10E3/UL (ref 0–1.3)
MONOCYTES NFR BLD AUTO: 11 %
MUCOUS THREADS #/AREA URNS LPF: PRESENT /LPF
NEUTROPHILS # BLD AUTO: 5.4 10E3/UL (ref 1.6–8.3)
NEUTROPHILS NFR BLD AUTO: 80 %
NITRATE UR QL: NEGATIVE
NRBC # BLD AUTO: 0 10E3/UL
NRBC BLD AUTO-RTO: 0 /100
PH UR STRIP: 5.5 [PH] (ref 5–7)
PLATELET # BLD AUTO: 217 10E3/UL (ref 150–450)
POTASSIUM SERPL-SCNC: 4 MMOL/L (ref 3.4–5.3)
RBC # BLD AUTO: 3.56 10E6/UL (ref 4.4–5.9)
RBC URINE: <1 /HPF
SODIUM SERPL-SCNC: 143 MMOL/L (ref 135–145)
SP GR UR STRIP: 1.03 (ref 1–1.03)
UROBILINOGEN UR STRIP-MCNC: <2 MG/DL
WBC # BLD AUTO: 6.8 10E3/UL (ref 4–11)
WBC URINE: 1 /HPF

## 2024-09-13 PROCEDURE — 73562 X-RAY EXAM OF KNEE 3: CPT | Mod: LT

## 2024-09-13 PROCEDURE — 99207 PR NO BILLABLE SERVICE THIS VISIT: CPT | Performed by: HOSPITALIST

## 2024-09-13 PROCEDURE — G0378 HOSPITAL OBSERVATION PER HR: HCPCS

## 2024-09-13 PROCEDURE — 86140 C-REACTIVE PROTEIN: CPT | Performed by: EMERGENCY MEDICINE

## 2024-09-13 PROCEDURE — 85025 COMPLETE CBC W/AUTO DIFF WBC: CPT | Performed by: EMERGENCY MEDICINE

## 2024-09-13 PROCEDURE — 81001 URINALYSIS AUTO W/SCOPE: CPT | Performed by: EMERGENCY MEDICINE

## 2024-09-13 PROCEDURE — 97116 GAIT TRAINING THERAPY: CPT | Mod: GP

## 2024-09-13 PROCEDURE — 250N000013 HC RX MED GY IP 250 OP 250 PS 637: Performed by: INTERNAL MEDICINE

## 2024-09-13 PROCEDURE — 250N000013 HC RX MED GY IP 250 OP 250 PS 637: Performed by: HOSPITALIST

## 2024-09-13 PROCEDURE — 97161 PT EVAL LOW COMPLEX 20 MIN: CPT | Mod: GP

## 2024-09-13 PROCEDURE — 99222 1ST HOSP IP/OBS MODERATE 55: CPT | Mod: FS | Performed by: INTERNAL MEDICINE

## 2024-09-13 PROCEDURE — 36415 COLL VENOUS BLD VENIPUNCTURE: CPT | Performed by: EMERGENCY MEDICINE

## 2024-09-13 PROCEDURE — 97110 THERAPEUTIC EXERCISES: CPT | Mod: GP

## 2024-09-13 PROCEDURE — 80048 BASIC METABOLIC PNL TOTAL CA: CPT | Performed by: EMERGENCY MEDICINE

## 2024-09-13 PROCEDURE — 84443 ASSAY THYROID STIM HORMONE: CPT | Performed by: HOSPITALIST

## 2024-09-13 RX ORDER — LIDOCAINE 40 MG/G
CREAM TOPICAL
Status: DISCONTINUED | OUTPATIENT
Start: 2024-09-13 | End: 2024-09-14 | Stop reason: HOSPADM

## 2024-09-13 RX ORDER — CAPSAICIN 0.025 %
CREAM (GRAM) TOPICAL 3 TIMES DAILY PRN
Status: DISCONTINUED | OUTPATIENT
Start: 2024-09-13 | End: 2024-09-14 | Stop reason: HOSPADM

## 2024-09-13 RX ORDER — NALOXONE HYDROCHLORIDE 0.4 MG/ML
0.2 INJECTION, SOLUTION INTRAMUSCULAR; INTRAVENOUS; SUBCUTANEOUS
Status: DISCONTINUED | OUTPATIENT
Start: 2024-09-13 | End: 2024-09-14 | Stop reason: HOSPADM

## 2024-09-13 RX ORDER — TAMSULOSIN HYDROCHLORIDE 0.4 MG/1
0.4 CAPSULE ORAL DAILY
Status: DISCONTINUED | OUTPATIENT
Start: 2024-09-13 | End: 2024-09-14 | Stop reason: HOSPADM

## 2024-09-13 RX ORDER — ACETAMINOPHEN 325 MG/1
975 TABLET ORAL EVERY 8 HOURS PRN
Status: DISCONTINUED | OUTPATIENT
Start: 2024-09-13 | End: 2024-09-14 | Stop reason: HOSPADM

## 2024-09-13 RX ORDER — MULTIVITAMIN,THERAPEUTIC
1 TABLET ORAL DAILY
Status: DISCONTINUED | OUTPATIENT
Start: 2024-09-13 | End: 2024-09-14 | Stop reason: HOSPADM

## 2024-09-13 RX ORDER — CAPSAICIN 0.025 %
CREAM (GRAM) TOPICAL 3 TIMES DAILY PRN
Status: DISCONTINUED | OUTPATIENT
Start: 2024-09-13 | End: 2024-09-13

## 2024-09-13 RX ORDER — ACETAMINOPHEN 325 MG/1
650 TABLET ORAL ONCE
Status: COMPLETED | OUTPATIENT
Start: 2024-09-13 | End: 2024-09-13

## 2024-09-13 RX ORDER — CALCIUM CARBONATE 500 MG/1
1000 TABLET, CHEWABLE ORAL 4 TIMES DAILY PRN
Status: DISCONTINUED | OUTPATIENT
Start: 2024-09-13 | End: 2024-09-14 | Stop reason: HOSPADM

## 2024-09-13 RX ORDER — NALOXONE HYDROCHLORIDE 0.4 MG/ML
0.4 INJECTION, SOLUTION INTRAMUSCULAR; INTRAVENOUS; SUBCUTANEOUS
Status: DISCONTINUED | OUTPATIENT
Start: 2024-09-13 | End: 2024-09-14 | Stop reason: HOSPADM

## 2024-09-13 RX ORDER — LEVOTHYROXINE SODIUM 25 UG/1
25 TABLET ORAL DAILY
Status: DISCONTINUED | OUTPATIENT
Start: 2024-09-13 | End: 2024-09-14 | Stop reason: HOSPADM

## 2024-09-13 RX ORDER — AMOXICILLIN 250 MG
1 CAPSULE ORAL 2 TIMES DAILY PRN
Status: DISCONTINUED | OUTPATIENT
Start: 2024-09-13 | End: 2024-09-14 | Stop reason: HOSPADM

## 2024-09-13 RX ORDER — ASPIRIN 81 MG/1
81 TABLET ORAL 2 TIMES DAILY
Status: DISCONTINUED | OUTPATIENT
Start: 2024-09-13 | End: 2024-09-14 | Stop reason: HOSPADM

## 2024-09-13 RX ORDER — AMOXICILLIN 250 MG
1 CAPSULE ORAL 2 TIMES DAILY PRN
Status: DISCONTINUED | OUTPATIENT
Start: 2024-09-13 | End: 2024-09-13

## 2024-09-13 RX ORDER — OXYCODONE HYDROCHLORIDE 5 MG/1
5 TABLET ORAL AT BEDTIME
Status: DISCONTINUED | OUTPATIENT
Start: 2024-09-13 | End: 2024-09-14 | Stop reason: HOSPADM

## 2024-09-13 RX ORDER — AMOXICILLIN 250 MG
2 CAPSULE ORAL 2 TIMES DAILY PRN
Status: DISCONTINUED | OUTPATIENT
Start: 2024-09-13 | End: 2024-09-14 | Stop reason: HOSPADM

## 2024-09-13 RX ORDER — CHLORAL HYDRATE 500 MG
1 CAPSULE ORAL DAILY
COMMUNITY

## 2024-09-13 RX ADMIN — TAMSULOSIN HYDROCHLORIDE 0.4 MG: 0.4 CAPSULE ORAL at 18:28

## 2024-09-13 RX ADMIN — DONEPEZIL HYDROCHLORIDE 15 MG: 10 TABLET ORAL at 21:20

## 2024-09-13 RX ADMIN — OXYCODONE HYDROCHLORIDE 5 MG: 5 TABLET ORAL at 21:19

## 2024-09-13 RX ADMIN — ASPIRIN 81 MG: 81 TABLET, COATED ORAL at 19:07

## 2024-09-13 RX ADMIN — ACETAMINOPHEN 975 MG: 325 TABLET ORAL at 12:57

## 2024-09-13 RX ADMIN — LEVOTHYROXINE SODIUM 25 MCG: 25 TABLET ORAL at 18:28

## 2024-09-13 RX ADMIN — Medication 1 TABLET: at 18:28

## 2024-09-13 RX ADMIN — THERA TABS 1 TABLET: TAB at 18:27

## 2024-09-13 RX ADMIN — ACETAMINOPHEN 650 MG: 325 TABLET ORAL at 19:07

## 2024-09-13 ASSESSMENT — ACTIVITIES OF DAILY LIVING (ADL)
ADLS_ACUITY_SCORE: 39
ADLS_ACUITY_SCORE: 38
ADLS_ACUITY_SCORE: 37
ADLS_ACUITY_SCORE: 39
ADLS_ACUITY_SCORE: 38
DEPENDENT_IADLS:: INDEPENDENT
ADLS_ACUITY_SCORE: 38
ADLS_ACUITY_SCORE: 39

## 2024-09-13 ASSESSMENT — ENCOUNTER SYMPTOMS
DYSURIA: 0
RESPIRATORY NEGATIVE: 1

## 2024-09-13 NOTE — ED PROVIDER NOTES
EMERGENCY DEPARTMENT ENCOUNTER      NAME: Vijay Fernandez  AGE: 79 year old male  YOB: 1945  MRN: [id number]  EVALUATION DATE & TIME: 9/13/2024  1:30 AM    PCP: Brant Parkinson    ED PROVIDER: Alexandrea Krishnamurthy M.D.      Chief Complaint   Patient presents with    Post-op Problem         FINAL IMPRESSION:  1. History of dementia    2. Status post total left knee replacement        MEDICAL DECISION MAKING:    Pertinent Labs & Imaging studies reviewed. (See chart for details)  ED Course as of 09/13/24 0612   Fri Sep 13, 2024   0241 Afebrile.  Vital signs here unremarkable.  Patient is coming in with daughter for review of left knee bleeding as well as some behavioral disturbances at home.  Patient has dementia, lives with long-term girlfriend who is currently out of town.  Has been staying with his daughter.  More confused since the surgery.  Impulsive at home, has been getting up.  She did hear some bumps in his room and is unsure if he bumped into the wall or if he fell.  She never found him on the ground.  Today noticed that he had some bleeding from his knee.  States that he has been acting much more aggressive at times at home.  She thinks this is a combination of the pain medication he is on from his knee as well as being in an unfamiliar environment after his surgery.  She is here for [facility] care for him.  He has not been complaining of any increasing pain.  She has not noted any urinary issues.  He been eating and drinking well.  Not had a cough.  No fevers chills.  No URI type symptoms or infective symptoms.    Physical exam for patient here lying in bed without any acute cardiopulmonary distress.  Exam for him is otherwise unremarkable.  Postop left knee.  Bandage taken down and surgical incision is intact.  There is some dried blood around the surgical incision but no active bleeding.  There is swelling noted to his left leg likely from the surgery.  He has strong pulses distally.    Will get an  x-ray.  There is no evidence of head trauma or bruising or any sort of injury anywhere.  Check some basic labs for patient here given this change in behavior.  His CRP did come back elevated this is likely secondary to the surgery but will serve his baseline.  He does not appear to have any infection noted in his knee.  There is no evidence of cellulitis.  Awaiting urinalysis.  Will be assisted care if medical workup negative.  X-ray here does not reveal any acute process by independent review         Medical Decision Making  Obtained supplemental history:Supplemental history obtained?: Documented in chart and Family Member/Significant Other  Reviewed external records: External records reviewed?: Documented in chart  Care impacted by chronic illness:Other: Dementia, HTN, MCI, prostate cancer metastatic to bone, and OA  Care significantly affected by social determinants of health:Access to Affordable Health Care  Did you consider but not order tests?: Work up considered but not performed and documented in chart, if applicable  Did you interpret images independently?: Independent interpretation of ECG and images noted in documentation, when applicable.  Consultation discussion with other provider:Did you involve another provider (consultant, , pharmacy, etc.)?: I discussed the care with another health care provider, see documentation for details.  Admit.  Not Applicable        Critical care: 0 minutes excluding separately billable procedures.  Includes bedside management, time reviewing test results, review of records, discussing the case with staff, documenting the medical record and time spent with family members (or surrogate decision makers) discussing specific treatment issues.          ED COURSE:  1:37 AM I met with the patient, obtained history, performed an initial exam, and discussed options and plan for diagnostics and treatment here in the ED.  3:26 AM Spoke with Dr. Linda, Hospitalist, regarding plan  "for admission. Patient is accepted for admission.    The importance of close follow up was discussed. We reviewed warning signs and symptoms, and I instructed Mr. Fernandez to return to the emergency department immediately if he develops any new or worsening symptoms. I provided additional verbal discharge instructions. Mr. Fernandez expressed understanding and agreement with this plan of care, his questions were answered, and he was discharged in stable condition.     MEDICATIONS GIVEN IN THE EMERGENCY:  Medications   lidocaine 1 % 0.1-1 mL (has no administration in time range)   lidocaine (LMX4) cream (has no administration in time range)   sodium chloride (PF) 0.9% PF flush 3 mL (has no administration in time range)   sodium chloride (PF) 0.9% PF flush 3 mL (has no administration in time range)   senna-docusate (SENOKOT-S/PERICOLACE) 8.6-50 MG per tablet 1 tablet (has no administration in time range)     Or   senna-docusate (SENOKOT-S/PERICOLACE) 8.6-50 MG per tablet 2 tablet (has no administration in time range)   calcium carbonate (TUMS) chewable tablet 1,000 mg (has no administration in time range)       NEW PRESCRIPTIONS STARTED AT TODAY'S ER VISIT:  New Prescriptions    No medications on file          =================================================================    HPI    Patient information was obtained from: Patient, patient's daughter    Use of : N/A        Vijay Fernandez is a 79 year old male who presents with post-op problem following S/P left knee arthroplasty (on 9/11/2024) along with left knee pain.     Patient reports he has been feeling fine sine status post left knee arthroplasty and notes today his left knee \"keeps bleeding\". States there was \"blood all over the place\" and indicates it started bleeding out three separate times. He endorses left knee pain. Otherwise he states he has been feeling okay lately. He denies problems with his breathing. He has no issues going to the bathroom or " "dysuria. Patient denies any falls or injury to left knee.    Per patient's daughter, reports patient has dementia. He has been living at a cabin with his girlfriend. Notes patient went over to her home so she can look over him. Patient slept 2 hours and then woke up and was really agitated and irritated. She heard a thud coming from room patient was in and doesn't know if the patient hit his head on the wall or fell. She didn't find the patient on the ground when she went to check on him.     She has been giving the patient Tylenol for his pain following the surgery but didn't give him oxycodone because she was concerned it would \"exacerbate things\". No other reported complaints or concerns at this time.      REVIEW OF SYSTEMS   Review of Systems   Respiratory: Negative.     Genitourinary: Negative.  Negative for dysuria.   Musculoskeletal:         Positive for left knee pain   All other systems reviewed and are negative.        PAST MEDICAL HISTORY:  Past Medical History:   Diagnosis Date    Arthritis     BPH with urinary obstruction     History of blood transfusion 1976    Hypothyroidism     Left-sided low back pain without sciatica     MCI (mild cognitive impairment)     Memory loss     Pelvic fracture (H) 1976    34 fractures in pelvis from crush injury    Prostate cancer metastatic to bone (H)        PAST SURGICAL HISTORY:  Past Surgical History:   Procedure Laterality Date    ARTHROPLASTY KNEE Left 9/11/2024    Procedure: LEFT TOTAL KNEE ARTHROPLASTY;  Surgeon: Miquel Palomo MD;  Location: [facility] OR    AS TOTAL KNEE ARTHROPLASTY Right 2005    TONSILLECTOMY & ADENOIDECTOMY         CURRENT MEDICATIONS:      Current Facility-Administered Medications:     calcium carbonate (TUMS) chewable tablet 1,000 mg, 1,000 mg, Oral, 4x Daily PRN, Alexandr Linda MD    lidocaine (LMX4) cream, , Topical, Q1H PRN, Alexandr Linda MD    lidocaine 1 % 0.1-1 mL, 0.1-1 mL, Other, Q1H PRN, Alexandr Linda, " MD    senna-docusate (SENOKOT-S/PERICOLACE) 8.6-50 MG per tablet 1 tablet, 1 tablet, Oral, BID PRN **OR** senna-docusate (SENOKOT-S/PERICOLACE) 8.6-50 MG per tablet 2 tablet, 2 tablet, Oral, BID PRN, Alexandr Linda MD    sodium chloride (PF) 0.9% PF flush 3 mL, 3 mL, Intracatheter, Q8H, Alexandr Linda MD    sodium chloride (PF) 0.9% PF flush 3 mL, 3 mL, Intracatheter, q1 min prn, Alexandr Linda MD    Current Outpatient Medications:     acetaminophen (TYLENOL) 500 MG tablet, Take 1,000 mg by mouth every 6 hours as needed for mild pain., Disp: , Rfl:     apalutamide (ERLEADA) 60 MG tablet, Take 240 mg by mouth daily., Disp: , Rfl:     aspirin 81 MG EC tablet, Take 1 tablet (81 mg) by mouth 2 times daily., Disp: 84 tablet, Rfl: 0    donepezil (ARICEPT) 10 MG tablet, Take 15 mg by mouth at bedtime., Disp: , Rfl:     levothyroxine (SYNTHROID/LEVOTHROID) 25 MCG tablet, Take 25 mcg by mouth daily., Disp: , Rfl:     multivitamin, therapeutic (THERA-VIT) TABS tablet, Take 1 tablet by mouth daily., Disp: , Rfl:     Omega-3-acid Ethyl Esters (FISH OIL OMEGA-3 FATTY ACID) 320MG/ML oral suspension, Take 1,000 mg by mouth daily., Disp: , Rfl:     oxyCODONE (ROXICODONE) 5 MG tablet, Take 1 tablet (5 mg) by mouth at bedtime., Disp: 25 tablet, Rfl: 0    senna-docusate (SENOKOT-S/PERICOLACE) 8.6-50 MG tablet, Take 1 tablet by mouth 2 times daily as needed for constipation., Disp: 42 tablet, Rfl: 0    tamsulosin (FLOMAX) 0.4 MG capsule, Take 0.4 mg by mouth daily., Disp: , Rfl:     vitamin B-Complex, Take 1 tablet by mouth daily., Disp: , Rfl:     ALLERGIES:  No Known Allergies    FAMILY HISTORY:  No family history on file.    SOCIAL HISTORY:   Social History     Socioeconomic History    Marital status: Single   Tobacco Use    Smoking status: Never    Smokeless tobacco: Never   Vaping Use    Vaping status: Never Used   Substance and Sexual Activity    Alcohol use: Yes     Comment: 3-7 drinks per week    Drug use: Never  "    Social Determinants of Health     Financial Resource Strain: Low Risk  (9/12/2024)    Financial Resource Strain     Within the past 12 months, have you or your family members you live with been unable to get utilities (heat, electricity) when it was really needed?: No   Food Insecurity: Low Risk  (9/12/2024)    Food Insecurity     Within the past 12 months, did you worry that your food would run out before you got money to buy more?: No     Within the past 12 months, did the food you bought just not last and you didn t have money to get more?: No   Transportation Needs: Low Risk  (9/12/2024)    Transportation Needs     Within the past 12 months, has lack of transportation kept you from medical appointments, getting your medicines, non-medical meetings or appointments, work, or from getting things that you need?: No    Received from [facility]    Social Connections   Interpersonal Safety: Low Risk  (9/11/2024)    Interpersonal Safety     Do you feel physically and emotionally safe where you currently live?: Yes     Within the past 12 months, have you been hit, slapped, kicked or otherwise physically hurt by someone?: No     Within the past 12 months, have you been humiliated or emotionally abused in other ways by your partner or ex-partner?: No   Housing Stability: Low Risk  (9/12/2024)    Housing Stability     Do you have housing? : Yes     Are you worried about losing your housing?: No       PHYSICAL EXAM:    Vitals: /61   Pulse 67   Temp 98  F (36.7  C) (Oral)   Resp 18   Ht 1.753 m (5' 9\")   Wt 91.6 kg (202 lb)   SpO2 97%   BMI 29.83 kg/m     General:. Alert and interactive, comfortable appearing. Lying in bed without any acute cardiopulmonary distress.  HENT: Oropharynx without erythema or exudates. MMM.  TMs clear bilaterally.  Eyes: Pupils mid-sized and equally reactive.   Neck: Full AROM.  No midline tenderness to palpation.  Cardiovascular: Regular rate and " rhythm. Peripheral pulses 2+ bilaterally.  Chest/Pulmonary: Normal work of breathing. Lung sounds clear and equal throughout, no wheezes or crackles. No chest wall tenderness or deformities.  Abdomen: Soft, nondistended. Nontender without guarding or rebound.  Back/Spine: No CVA or midline tenderness.  Extremities: Normal ROM of all major joints. No lower extremity edema.   Skin: Warm and dry. Normal skin color. Postop left knee. Bandage taken down and surgical incision is intact. There is some dried blood around the surgical incision but no active bleeding. There is swelling noted to his left leg likely from the surgery. He has strong pulses distally.  Neuro: Speech clear. CNs grossly intact. Moves all extremities appropriately. Strength and sensation grossly intact to all extremities.   Psych: Normal affect/mood, cooperative, memory appropriate.     LAB:  All pertinent labs reviewed and interpreted.  Labs Ordered and Resulted from Time of ED Arrival to Time of ED Departure   BASIC METABOLIC PANEL - Abnormal       Result Value    Sodium 143      Potassium 4.0      Chloride 105      Carbon Dioxide (CO2) 24      Anion Gap 14      Urea Nitrogen 13.0      Creatinine 0.93      GFR Estimate 84      Calcium 8.5 (*)     Glucose 127 (*)    CRP INFLAMMATION - Abnormal    CRP Inflammation 158.00 (*)    ROUTINE UA WITH MICROSCOPIC REFLEX TO CULTURE - Abnormal    Color Urine Yellow      Appearance Urine Clear      Glucose Urine Negative      Bilirubin Urine Negative      Ketones Urine 10 (*)     Specific Gravity Urine 1.030      Blood Urine Negative      pH Urine 5.5      Protein Albumin Urine 30 (*)     Urobilinogen Urine <2.0      Nitrite Urine Negative      Leukocyte Esterase Urine Negative      Mucus Urine Present (*)     RBC Urine <1      WBC Urine 1     CBC WITH PLATELETS AND DIFFERENTIAL - Abnormal    WBC Count 6.8      RBC Count 3.56 (*)     Hemoglobin 10.8 (*)     Hematocrit 31.9 (*)     MCV 90      MCH 30.3      MCHC  33.9      RDW 13.6      Platelet Count 217      % Neutrophils 80      % Lymphocytes 9      % Monocytes 11      % Eosinophils 0      % Basophils 0      % Immature Granulocytes 0      NRBCs per 100 WBC 0      Absolute Neutrophils 5.4      Absolute Lymphocytes 0.6 (*)     Absolute Monocytes 0.8      Absolute Eosinophils 0.0      Absolute Basophils 0.0      Absolute Immature Granulocytes 0.0      Absolute NRBCs 0.0         RADIOLOGY:  XR Knee Left 3 Views   Final Result   IMPRESSION:    1. A left knee arthroplasty is in place. No evidence of hardware failure.   2. No visualized acute fracture or malalignment of the left knee.   3. Small knee joint effusion.           EKG:    N/A    PROCEDURES:   N/A      I, Angeles Ortiz, am serving as a scribe to document services personally performed by Dr. Alexandrea Krishnamurthy  based on my observation and the provider's statements to me. I, Alexandrea Krishnamurthy MD attest that Angeles Ortiz is acting in a scribe capacity, has observed my performance of the services and has documented them in accordance with my direction.      Alexandrea Krishnamurthy M.D.  Emergency Medicine  [facility] EMERGENCY ROOM  [address] [id number] [phone]  Dept: [phone]     Alexandrea Krishnamurthy MD  09/13/24 0612

## 2024-09-13 NOTE — PHARMACY-ADMISSION MEDICATION HISTORY
Pharmacy Intern Admission Medication History    Admission medication history is complete. The information provided in this note is only as accurate as the sources available at the time of the update.    Information Source(s): Family member via phone    Pertinent Information: Of note, patient was discharged from surgery yesterday morning (9/12). Left voicemail with daughter, Tamra ([phone]), to verify bedtime doses.     Changes made to PTA medication list:  Added: None  Deleted: None  Changed:   Erleada from 60 mg tablet to 240 mg tablet.  Fish oil from suspension to capsule.     Allergies reviewed with patient and updates made in EHR: yes    Medication History Completed By: Marely Mancia 9/13/2024 9:04 AM    PTA Med List   Medication Sig Last Dose    acetaminophen (TYLENOL) 500 MG tablet Take 1,000 mg by mouth every 6 hours as needed for mild pain.  at prn    Apalutamide 240 MG TABS Take 240 mg by mouth daily. 9/12/2024 at patient supply from home    aspirin 81 MG EC tablet Take 1 tablet (81 mg) by mouth 2 times daily. 9/12/2024 at pm    donepezil (ARICEPT) 10 MG tablet Take 15 mg by mouth at bedtime. 9/12/2024 at pm    fish oil-omega-3 fatty acids 1000 MG capsule Take 1 g by mouth daily. Unknown at not since surgery    levothyroxine (SYNTHROID/LEVOTHROID) 25 MCG tablet Take 25 mcg by mouth daily.  at not since sugery    multivitamin, therapeutic (THERA-VIT) TABS tablet Take 1 tablet by mouth daily.  at not since surgery    oxyCODONE (ROXICODONE) 5 MG tablet Take 1 tablet (5 mg) by mouth at bedtime.  at for discharge    senna-docusate (SENOKOT-S/PERICOLACE) 8.6-50 MG tablet Take 1 tablet by mouth 2 times daily as needed for constipation.  at for discharge    tamsulosin (FLOMAX) 0.4 MG capsule Take 0.4 mg by mouth daily. 9/12/2024 at am    vitamin B-Complex Take 1 tablet by mouth daily. Unknown at not since surgery     Thank you,  Marely Mancia, Student Pharmacist  [facility]

## 2024-09-13 NOTE — ED NOTES
Pt came out of the room with a walker and walked to the restroom. States he is doing okay. Unable to rate pain at this time. Leg wound was bleeding slightly post ambulation to the bathroom. Wound cleansed with saline, covered with 4x4 and wrapped with ace wrap. Pt instructed to use the call light when in need. Lights turned off. Pt is resting with eyes closed.

## 2024-09-13 NOTE — CONSULTS
Care Management Initial Consult      General Information  Assessment completed with: Geneva, Daughter Crissy  Type of CM/SW Visit: Initial Assessment  Primary Care Provider verified and updated as needed: Yes   Readmission within the last 30 days: previous discharge plan unsuccessful   Return Category: Post-op/Post-procedure complication  Reason for Consult: discharge planning  Advance Care Planning:          Communication Assessment  Patient's communication style: spoken language (English or Bilingual)        Cognitive  Cognitive/Neuro/Behavioral: .WDL except, all    Level of Consciousness: alert, intermittent confusion    Arousal Level: opens eyes spontaneously    Orientation: disoriented to, situation       Best Language: 0 - No aphasia    Speech: logical, spontaneous    Living Environment:   People in home: significant other     Current living Arrangements: house      Able to return to prior arrangements: yes     Family/Social Support:  Care provided by: spouse/significant other, child(maricruz)  Provides care for: no one, unable/limited ability to care for self  Marital Status: Lives with Significant Other  Support system: Significant Other, Children       Abeba  Description of Support System: Supportive, Involved (S/o cares for the pt when she is at the home. S/o often leaves to go to a cabin with family. Currently out of town for this coming weekend)    Support Assessment: Adequate family and caregiver support, Adequate social supports (When at home and at baseline. Currently without sufficient support at home)    Current Resources:   Patient receiving home care services: No  Community Resources: None  Equipment currently used at home: grab bar, tub/shower  Supplies currently used at home: None    Employment/Financial:  Employment Status: employed part-time     Financial Concerns: none   Referral to Financial Worker: No     Does the patient's insurance plan have a 3 day qualifying hospital stay waiver?   No    Lifestyle & Psychosocial Needs:  Social Determinants of Health     Food Insecurity: Low Risk  (9/12/2024)    Food Insecurity     Within the past 12 months, did you worry that your food would run out before you got money to buy more?: No     Within the past 12 months, did the food you bought just not last and you didn t have money to get more?: No   Depression: Not at risk (11/6/2023)    Received from [facility]    PHQ-2     PHQ-2 Score: 0   Housing Stability: Low Risk  (9/12/2024)    Housing Stability     Do you have housing? : Yes     Are you worried about losing your housing?: No   Tobacco Use: Low Risk  (9/11/2024)    Patient History     Smoking Tobacco Use: Never     Smokeless Tobacco Use: Never     Passive Exposure: Not on file   Financial Resource Strain: Low Risk  (9/12/2024)    Financial Resource Strain     Within the past 12 months, have you or your family members you live with been unable to get utilities (heat, electricity) when it was really needed?: No   Alcohol Use: Not on file   Transportation Needs: Low Risk  (9/12/2024)    Transportation Needs     Within the past 12 months, has lack of transportation kept you from medical appointments, getting your medicines, non-medical meetings or appointments, work, or from getting things that you need?: No   Physical Activity: Not on file   Interpersonal Safety: Low Risk  (9/11/2024)    Interpersonal Safety     Do you feel physically and emotionally safe where you currently live?: Yes     Within the past 12 months, have you been hit, slapped, kicked or otherwise physically hurt by someone?: No     Within the past 12 months, have you been humiliated or emotionally abused in other ways by your partner or ex-partner?: No   Stress: Not on file   Social Connections: Unknown (1/1/2022)    Received from [facility]    Social Connections     Frequency of Communication with Friends and Family: Not on file   Health Literacy: Not  "on file       Functional Status:  Prior to admission patient needed assistance:   Dependent ADLs:: Independent (When at baseline)  Dependent IADLs:: Independent (When at baseline)  Assessment of Functional Status: Not at  functional baseline    Mental Health Status:  Mental Health Status: No Current Concerns       Chemical Dependency Status:  Chemical Dependency Status: No Current Concerns           Values/Beliefs:  Spiritual, Cultural Beliefs, Anabaptist Practices, Values that affect care: no             Discussed  Partnership in Safe Discharge Planning  document with patient/family: No    Additional Information:  Pt was just discharged 9/12 after a total knee replacement. Pt was brought to his daughter's home(an unfamiliar environment) and his confusion progressed to the point pt was walking around naked and asking to go to his room. Pt may have got up and fallen at some point. Daughter noticed pt's wound was bleeding heavily. Due to the heavy bleeding and uncontrollable confusion, daughter brought the pt in to this ED. Daughter Crissy at [phone] is a family practice physician, is well aware of the situation and is open to any available assistance. Crissy states pt is not normally this confused as he functions in the world - driving and working. Crissy stated the pt seems to be \"sundowning\" and he has no history of that. Crissy stated she has no issue with taking pt home if he improves back to near baseline or better. Crissy will modify her life to bring pt to his own home and improve his chances of success.    Next Steps:   Medical progression pending. Current KATHLEEN states 2-4 days. CM to follow and assist with any service coordination needs as indicated. Matthew Woodward to transport.    Baron Jamison RN      "

## 2024-09-13 NOTE — H&P
"[facility]    History and Physical - Hospitalist Service       Date of Admission:  9/13/2024    Assessment & Plan      Vijay Fernandez is a 79 year old male admitted on 9/13/2024. 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  Patient reports that he had intermittent bleeding from the surgical site so he decided come to the ED.   Active Problems:    History of total knee arthroplasty, left    History of dementia    Status post total left knee replacement    Hypothyroidism    Prostate cancer metastatic to bone (H)    Hypercholesterolemia    #S/p left total knee arthroplasty: Presented with concerns for bleeding.  Wound care consult  Case management consult     # Dementia  Continue Aricept  Case management.   Cautious use of pain medications.     # Hypothyroidism  Continue Synthroid    #History of prostate cancer:   Apalutamide    #BPH:   Continue flomax           Diet: Combination Diet Low Saturated Fat Na <2400mg Diet, No Caffeine Diet    DVT Prophylaxis: Pneumatic Compression Devices and Anti-embolisim stockings (TEDs)  Su Catheter: Not present  Lines: None     Cardiac Monitoring: None  Code Status: Full Code      Clinically Significant Risk Factors Present on Admission                # Drug Induced Platelet Defect: home medication list includes an antiplatelet medication      # Anemia: based on hgb <11       # Overweight: Estimated body mass index is 29.83 kg/m  as calculated from the following:    Height as of this encounter: 1.753 m (5' 9\").    Weight as of this encounter: 91.6 kg (202 lb).       # Financial/Environmental Concerns:                 Disposition Plan     Medically Ready for Discharge: Anticipated in 2-4 Days           Alexandr Linda MD  Hospitalist " [facility]  Securely message with [employer] (more info)  Text page via [employer] Paging/Directory     ______________________________________________________________________    Chief Complaint   Bleeding from left knee surgical site.     History is obtained from the patient    History of Present Illness   Vijay Fernandez is a 79 year old male prostate cancer with metastasis to bone, hypothyroidism, mild cognitive impairment, dyslipidemia, dementia, and osteoarthritis status post left total knee arthroplasty on 9/11/2024) to the ED for bleeding at the surgical incision site.  Patient reports that he had intermittent bleeding from the surgical site so he decided come to the ED.  He denies any falls.  He denies any chest pain, shortness of air, fevers, chills, nausea, vomiting, diarrhea, constipation, dysuria, hematuria, melena, hematochezia, or any other new symptoms.    Patient reports that he lives with longtime female friend.  States that the friend is out of town so he has been staying with his daughter.    Per ED provider, patient is at the indicated that she is no longer able to take care of patient at home as patient is difficult to redirect and intermittently impulsive.      Past Medical History    Past Medical History:   Diagnosis Date    Arthritis     BPH with urinary obstruction     History of blood transfusion 1976    Hypothyroidism     Left-sided low back pain without sciatica     MCI (mild cognitive impairment)     Memory loss     Pelvic fracture (H) 1976    34 fractures in pelvis from crush injury    Prostate cancer metastatic to bone (H)        Past Surgical History   Past Surgical History:   Procedure Laterality Date    ARTHROPLASTY KNEE Left 9/11/2024    Procedure: LEFT TOTAL KNEE ARTHROPLASTY;  Surgeon: Miquel Palomo MD;  Location: [facility] OR    AS TOTAL KNEE ARTHROPLASTY Right 2005    TONSILLECTOMY & ADENOIDECTOMY         Prior to Admission  Medications   Prior to Admission Medications   Prescriptions Last Dose Informant Patient Reported? Taking?   Omega-3-acid Ethyl Esters (FISH OIL OMEGA-3 FATTY ACID) 320MG/ML oral suspension   Yes No   Sig: Take 1,000 mg by mouth daily.   acetaminophen (TYLENOL) 500 MG tablet   Yes No   Sig: Take 1,000 mg by mouth every 6 hours as needed for mild pain.   apalutamide (ERLEADA) 60 MG tablet   Yes No   Sig: Take 240 mg by mouth daily.   aspirin 81 MG EC tablet   No No   Sig: Take 1 tablet (81 mg) by mouth 2 times daily.   donepezil (ARICEPT) 10 MG tablet   Yes No   Sig: Take 15 mg by mouth at bedtime.   levothyroxine (SYNTHROID/LEVOTHROID) 25 MCG tablet   Yes No   Sig: Take 25 mcg by mouth daily.   multivitamin, therapeutic (THERA-VIT) TABS tablet   Yes No   Sig: Take 1 tablet by mouth daily.   oxyCODONE (ROXICODONE) 5 MG tablet   No No   Sig: Take 1 tablet (5 mg) by mouth at bedtime.   senna-docusate (SENOKOT-S/PERICOLACE) 8.6-50 MG tablet   No No   Sig: Take 1 tablet by mouth 2 times daily as needed for constipation.   tamsulosin (FLOMAX) 0.4 MG capsule   Yes No   Sig: Take 0.4 mg by mouth daily.   vitamin B-Complex   Yes No   Sig: Take 1 tablet by mouth daily.      Facility-Administered Medications: None        Review of Systems    The 12 point Review of Systems is negative other than noted in the HPI.    Social History   I have reviewed this patient's social history and updated it with pertinent information if needed.  Social History     Tobacco Use    Smoking status: Never    Smokeless tobacco: Never   Vaping Use    Vaping status: Never Used   Substance Use Topics    Alcohol use: Yes     Comment: 3-7 drinks per week    Drug use: Never         Allergies   No Known Allergies     Physical Exam   Vital Signs: Temp: 98  F (36.7  C) Temp src: Oral BP: 127/65 Pulse: 82   Resp: 18 SpO2: 94 % O2 Device: None (Room air)    Weight: 202 lbs 0 oz    General: AAOx3 with some cognitive impairment. Needs frequent redirecting to  obtain history. NAD, pleasant.  HEENT: NCAT, pupils are equal and round, anicteric, oral mucosa is moist.  Neck: Supple,  Cardiac: RRR.  +murmur. No rub or gallop.  Respiratory: CTAB, no crackles, wheezes, rales, or rhonchi  Abdomen: Soft, NT, ND, + bowel sounds  Extremity: No BLE edema, Left dressing has been taken off, no bleeding noted. pulses palpable.   Neuro: AAO x3, cooperative cognitive impairment.  Moves all extremities spontaneously.  Psych: Calm and cooperative.      Medical Decision Making       55 MINUTES SPENT BY ME on the date of service doing chart review, history, exam, documentation & further activities per the note.      Data   ------------------------- PAST 24 HR DATA REVIEWED -----------------------------------------------    I have personally reviewed the following data over the past 24 hrs:    6.8  \   10.8 (L)   / 217     143 105 13.0 /  127 (H)   4.0 24 0.93 \     Procal: N/A CRP: 158.00 (H) Lactic Acid: N/A

## 2024-09-13 NOTE — PROGRESS NOTES
[employer] cover - Complaining of pain left knee, wants stronger pain meds -- per RN no leg swelling    Plans  - Tylenol 650 mgs x1, May give 10PM oxycodone x1  - PRN capsaicin topicals to be applied to painful muscles or joints, not on open wounds  - RN to inform MD if there is painful or swollen left leg    Discussed with RN

## 2024-09-13 NOTE — ED TRIAGE NOTES
"Pt had knee replacement and pt reports \"it keeps bleeding\"  pt's daughter reports he is having increased impulsiveness since the surgery and increased symptoms of his dementia. Bleeding started a couple hours ago and hasn't stopped.  Pt has been restless.  Knee is wrapped currently and not bleeding through wrap at this time.  Daughter reports \"hearing a few thumps\" and is unsure if he fell or was bumping into walls.     Triage Assessment (Adult)       Row Name 09/12/24 5409          Triage Assessment    Airway WDL WDL        Respiratory WDL    Respiratory WDL WDL        Skin Circulation/Temperature WDL    Skin Circulation/Temperature WDL WDL        Cardiac WDL    Cardiac WDL WDL        Peripheral/Neurovascular WDL    Peripheral Neurovascular WDL WDL        Cognitive/Neuro/Behavioral WDL    Cognitive/Neuro/Behavioral WDL WDL                     "
Principal Discharge DX:	[place] infant of 39 completed weeks of gestation  Goal:	Well baby  Assessment and plan of treatment:	F/U PMD within 2-3 days.

## 2024-09-13 NOTE — PLAN OF CARE
"  Problem: Adult Inpatient Plan of Care  Goal: Absence of Hospital-Acquired Illness or Injury  Intervention: Identify and Manage Fall Risk  Recent Flowsheet Documentation  Taken 9/13/2024 1700 by Kaur Nino RN  Safety Promotion/Fall Prevention:   activity supervised   supervised activity   safety round/check completed   room near nurse's station   room door open   nonskid shoes/slippers when out of bed   PRIMARY DIAGNOSIS: \"GENERIC\" NURSING  OUTPATIENT/OBSERVATION GOALS TO BE MET BEFORE DISCHARGE:  ADLs back to baseline: Yes    Activity and level of assistance: Up with standby assistance.    Pain status: Improved-controlled with oral pain medications.    Return to near baseline physical activity: Yes     Discharge Planner Nurse   Safe discharge environment identified: Yes  Barriers to discharge: Yes       Entered by: Kaur Nino RN 09/13/2024 6:44 PM     Please review provider order for any additional goals.   Nurse to notify provider when observation goals have been met and patient is ready for discharge.Goal Outcome Evaluation:      Plan of Care Reviewed With: patient    Overall Patient Progress: improvingOverall Patient Progress: improvingpt alert and oriented, vss on room air, can voice needs, pain is mild, he is assist of one, awaiting orthopedic review  prior to discharge.           "

## 2024-09-13 NOTE — PLAN OF CARE
Problem: Adult Inpatient Plan of Care  Goal: Absence of Hospital-Acquired Illness or Injury  Intervention: Identify and Manage Fall Risk  Recent Flowsheet Documentation  Taken 9/13/2024 1550 by Laura Jimenez RN  Safety Promotion/Fall Prevention:   activity supervised   supervised activity   safety round/check completed   room near nurse's station   room door open   nonskid shoes/slippers when out of bed  Taken 9/13/2024 0742 by Laura Jimenez RN  Safety Promotion/Fall Prevention:   activity supervised   supervised activity   safety round/check completed   room near nurse's station   room door open   nonskid shoes/slippers when out of bed   Patient's vitals stable on RA. Ambulates with standby assist with walker and gaitbelt. Plan for [facility] to see in the AM.

## 2024-09-13 NOTE — PROGRESS NOTES
09/13/24 1500   Appointment Info   Signing Clinician's Name / Credentials (PT) JEAN-PIERRE Davila   Student Supervision Direct supervision provided;Therapy services provided with the co-signing licensed therapist guiding and directing the services, and providing the skilled judgement and assessment throughout the session   Quick Adds   Quick Adds Certification   Living Environment   Current Living Arrangements house   Home Accessibility stairs within home;stairs to enter home   Number of Stairs, Main Entrance 3   Stair Railings, Main Entrance railings safe and in good condition   Number of Stairs, Within Home, Primary greater than 10 stairs   Stair Railings, Within Home, Primary railing on right side (ascending)   Self-Care   Usual Activity Tolerance good   Current Activity Tolerance moderate   Equipment Currently Used at Home walker, rolling   Fall history within last six months no   General Information   Onset of Illness/Injury or Date of Surgery 09/13/24   Referring Physician Dr. Bert Ontiveros   Patient/Family Therapy Goals Statement (PT) Return home   Pertinent History of Current Problem (include personal factors and/or comorbidities that impact the POC) L TKA   Existing Precautions/Restrictions no known precautions/restrictions   Weight-Bearing Status - LLE weight-bearing as tolerated   Bed Mobility   Bed Mobility sit-supine   Sit-Supine Shushan (Bed Mobility) modified independence   Bed Mobility Limitations decreased ability to use legs for bridging/pushing   Impairments Contributing to Impaired Bed Mobility pain;decreased strength   Transfers   Transfers sit-stand transfer   Sit-Stand Transfer   Sit-Stand Shushan (Transfers) verbal cues;supervision   Assistive Device (Sit-Stand Transfers) walker, front-wheeled   Gait/Stairs (Locomotion)   Shushan Level (Gait) independent;contact guard;verbal cues;1 person assist  (w/c follow)   Assistive Device (Gait) walker, front-wheeled   Distance in Feet  (Gait) 10   Pattern (Gait) swing-through   Deviations/Abnormal Patterns (Gait) stride length decreased;mikie decreased;weight shifting decreased   Maintains Weight-bearing Status (Gait) able to maintain   Clinical Impression   Criteria for Skilled Therapeutic Intervention Yes, treatment indicated   PT Diagnosis (PT) Impaired functional mobility   Influenced by the following impairments pain, weakness,   Functional limitations due to impairments transfers, gait, stairs   Clinical Presentation (PT Evaluation Complexity) stable   Clinical Presentation Rationale Pt presents as medically diagnosed   Clinical Decision Making (Complexity) low complexity   Planned Therapy Interventions (PT) gait training;patient/family education;stair training;home exercise program   Risk & Benefits of therapy have been explained evaluation/treatment results reviewed;care plan/treatment goals reviewed;patient   PT Total Evaluation Time   PT Eval, Low Complexity Minutes (40569) 10   Therapy Certification   Start of care date 09/13/24   Certification date from 09/13/24   Certification date to 10/13/24   Medical Diagnosis Confusion s/p L TKA   Physical Therapy Goals   PT Frequency One time eval and treatment only   PT Predicted Duration/Target Date for Goal Attainment 09/13/24   PT Goals Transfers;Gait;Stairs   PT: Transfers Modified independent;Sit to/from stand;Assistive device;Goal Met   PT: Gait Supervision/stand-by assist;Rolling walker;100 feet;Goal Met   PT: Stairs Supervision/stand-by assist;3 stairs;Rail on right;Goal Met   Interventions   Interventions Quick Adds Gait Training;Therapeutic Activity;Therapeutic Procedure   Therapeutic Procedure/Exercise   Ther. Procedure: strength, endurance, ROM, flexibillity Minutes (58395) 10   Treatment Detail/Skilled Intervention Supine TKA protocol therex x10 reps minus LAQ and sitting stretch, cueing for technique and muscle activation. TC's needed for SAQ.   Therapeutic Activity   Treatment  Detail/Skilled Intervention supine to sit, mod I and use of bed rails. STS to FWW, SBA. Cues for safety and hand placement. Stand to sit, to bed, mod I. Cues for hand placement. sit to supine, mod I. Pt scooted to HOB, independently, w/use of bed rails.   Gait Training   Gait Training Minutes (52669) 15   Symptoms Noted During/After Treatment (Gait Training) none   Treatment Detail/Skilled Intervention GAIT: cues for safety and navigation. Noticed limp on L side and decreased ROM. Pt reported no feelings of knee buckling. Pt was able to amb a great distance w/o rest. STAIRS: ascended/descended 4 steps w/R rail. Cues for safety and LE sequencing on descent. Pt felt confident w/stairs.   Distance in Feet 400   Oden Level (Gait Training) stand-by assist   Physical Assistance Level (Gait Training) supervision;verbal cues;1 person assist  (chair follow)   Weight Bearing (Gait Training) weight-bearing as tolerated   Assistive Device (Gait Training) rolling walker   Pattern Analysis (Gait Training) swing-through gait   Gait Analysis Deviations decreased mikie;decreased stride length;decreased weight-shifting ability   Impairments (Gait Analysis/Training) pain;strength decreased;ROM decreased   PT Discharge Planning   PT Plan PT d/c   PT Discharge Recommendation (DC Rec) home with assist;home with home care physical therapy;home with outpatient physical therapy   PT Rationale for DC Rec Patient moving well but recommend 24 hour supervision/assist when ambulating for safety. Pt reported he was set up for outpatient PT, could also benefit from  Home care PT for house safety assessment, functional mobility training, and strengthening pending family preference. Pt has good family support.   PT Brief overview of current status Pt amb 400' w/FWW, SBA   PT Equipment Needed at Discharge walker, rolling   Total Session Time   Timed Code Treatment Minutes 25   Total Session Time (sum of timed and untimed services) 35

## 2024-09-13 NOTE — PROGRESS NOTES
"[facility]    Progress Note - Hospitalist Service       Date of Admission:  9/13/2024    Assessment & Plan   Vijay Fernandez is a 79 year old male with medical history of prostate cancer metastasis to bone, hypothyroidism, dementia and osteoarthritis s/p total left knee arthroplasty on 9/11 presented with bleeding at the surgical site. He is admitted for bleeding at the surgical site evaluation. Daughter is a family practice physician. She expressed to  with concern for \"sundowning\". Patient is stable and we will monitor for sign of active bleeding, infections and helping patient transition home after hospitalization. Orthopedics consulted as patient was just discharged by [facility] on 9/12 and represented to the ED same day for bleeding at surgical site. Status post left total knee arthroplasty on 9/11/2024.     #Bleeding at the surgical site  #S/P left total knee arthroplasty 9/11, POD # 2   - Pain is managed with tylenol and oxycodone, patient is afebrile, WBC wnl,   - Hgb 9.3 on 9/12, 10.8 today  - Xray of left knee is reassuring:  no evidence of hardware failure; no acute fracture or malalignment of the left knee; small knee join effusion.     - Hgb tomorrow morning, trending  - PTA ASA   - Ortho team evaluation    - Wound care consult    #History of hospital associated Delirium   #At risk for Sundowning   - Likely due to recent surgery and unfamiliar environment upon discharged. Patient used to live at home with a friend. He was discharged to daughter's home after the surgery and this is an unfamiliar environment for him.   - Patient is alert and oriented during interview, without gait instability   - UA shows no sign of infection     - Delirium precaution    - Encourage working with case management for support     #Dementia  - Continue Aricept  - Case management.   - Cautious use of pain medications.     # Constipation   - Patient has not have any bowel movement since 9/11 " "  - No nausea or vomiting     - Continue monitoring, will watch out for post-op ileus   - PRN stool softener      #Hypothyroidism  - Continue Synthroid     #History of prostate cancer  - Apalutamide     BPH  - Continue flomax           Diet: Combination Diet Low Saturated Fat Na <2400mg Diet, No Caffeine Diet    DVT Prophylaxis: Low Risk/Ambulatory with no VTE prophylaxis indicated  Su Catheter: Not present  Fluids: None   Lines: None     Cardiac Monitoring: None  Code Status: Full Code      Clinically Significant Risk Factors Present on Admission                # Drug Induced Platelet Defect: home medication list includes an antiplatelet medication      # Anemia: based on hgb <11       # Overweight: Estimated body mass index is 29.83 kg/m  as calculated from the following:    Height as of this encounter: 1.753 m (5' 9\").    Weight as of this encounter: 91.6 kg (202 lb).       # Financial/Environmental Concerns: none               Disposition Plan  Pending on having a clear from the ortho team regarding surgical site. Plan for home upon discharge per case management and discussion with family member. No acute bleeding, or infection.      Expected Discharge Date: 09/14/2024      Destination: home with family          The patient's care was discussed with the Attending Physician, Dr. Bert Ontiveros .    Taryn ÁLVAREZ   Hospitalist Service  [facility]  Securely message with [employer] (more info)  Text page via [employer] Paging/Directory     I agree with the documentation and findings as written by MICKEY Elam and our discussed and formulated assessment and plan. I was present with Taryn who participated in the service and documentation of the note. I have also personally verified the history and personally performed the physical exam and medical decision-making. I agree with the assessment and plan of care as documented in the note.    Bert Ontiveros MD  Internal " Medicine  Hospitalist  [facility]  Phone: #[phone]  ______________________________________________________________________    Interval History   No significant event overnight. Today is POD2. Patient states the pain from surgery is controlled with tylenol. He took some oxycodone yesterday and has not need one since, currently rated pain 4/10. He does not notice any active bleeding on the surgical site. Eating without nausea or vomiting. No urinary issues however reports he has not have any bowel movement since surgery. No fever, chill. Moving without weakness or difficulty with a walker.     Physical Exam   Vital Signs: Temp: 98.1  F (36.7  C) Temp src: Oral BP: 139/70 Pulse: 70   Resp: 18 SpO2: 97 % O2 Device: None (Room air)    Weight: 202 lbs 0 oz    Constitutional: awake, alert, cooperative, no apparent distress  Respiratory: clear to auscultation, no crackles or wheezing  Cardiovascular: Normal apical impulse, regular rate and rhythm, normal S1 and S2, no S3 or S4, and Holosystolic murmur best heard at the second intercostal space radiate to his back.   GI: Normal bowel sounds, soft, non-distended, non-tender, no masses palpated  Skin:  Left knee is nicely wrapped without bruising or bleeding near or on the bandage. No erythema, drainage proximal or distal to the incision site. Skin is warm and moist. No rash.   Musculoskeletal: Mild edema on the left lower foot, normal range of motion, intact sensation and normal posterior tibial pulse presented bilaterally.     Medical Decision Making     Post-midnight so not billed to patient.                                                                                     Data     I have personally reviewed the following data over the past 24 hrs:    6.8  \   10.8 (L)   / 217     143 105 13.0 /  127 (H)   4.0 24 0.93 \     Procal: N/A CRP: 158.00 (H) Lactic Acid: N/A         Imaging results reviewed over the past 24 hrs:   Recent Results  (from the past 24 hour(s))   XR Knee Left 3 Views    Narrative    EXAM: LEFT KNEE 3 VIEWS  LOCATION: [facility]  DATE/TIME: 9/13/2024 2:08 AM CDT    INDICATION: Status post recent knee arthroplasty. Bleeding. Fall.  COMPARISON: 9/11/2024 at 1448 hours.      Impression    IMPRESSION:   1. A left knee arthroplasty is in place. No evidence of hardware failure.  2. No visualized acute fracture or malalignment of the left knee.  3. Small knee joint effusion.

## 2024-09-13 NOTE — PROGRESS NOTES
Physical Therapy Discharge Summary    Reason for therapy discharge:    All goals and outcomes met, no further needs identified.    Progress towards therapy goal(s). See goals on Care Plan in [facility] electronic health record for goal details.  Goals met    Therapy recommendation(s):    Continue home exercise program.

## 2024-09-14 ENCOUNTER — APPOINTMENT (OUTPATIENT)
Dept: OCCUPATIONAL THERAPY | Facility: CLINIC | Age: 79
End: 2024-09-14
Attending: HOSPITALIST
Payer: MEDICARE

## 2024-09-14 ENCOUNTER — APPOINTMENT (OUTPATIENT)
Dept: PHYSICAL THERAPY | Facility: CLINIC | Age: 79
End: 2024-09-14
Payer: MEDICARE

## 2024-09-14 VITALS
SYSTOLIC BLOOD PRESSURE: 117 MMHG | OXYGEN SATURATION: 94 % | HEART RATE: 69 BPM | DIASTOLIC BLOOD PRESSURE: 56 MMHG | TEMPERATURE: 97.6 F | WEIGHT: 202 LBS | BODY MASS INDEX: 29.92 KG/M2 | HEIGHT: 69 IN | RESPIRATION RATE: 16 BRPM

## 2024-09-14 LAB
FOLATE SERPL-MCNC: 19.1 NG/ML (ref 4.6–34.8)
HGB BLD-MCNC: 9.4 G/DL (ref 13.3–17.7)
PROCALCITONIN SERPL IA-MCNC: 0.1 NG/ML
TSH SERPL DL<=0.005 MIU/L-ACNC: 2.69 UIU/ML (ref 0.3–4.2)
VIT B12 SERPL-MCNC: 543 PG/ML (ref 232–1245)

## 2024-09-14 PROCEDURE — 36415 COLL VENOUS BLD VENIPUNCTURE: CPT | Performed by: HOSPITALIST

## 2024-09-14 PROCEDURE — 250N000013 HC RX MED GY IP 250 OP 250 PS 637: Performed by: HOSPITALIST

## 2024-09-14 PROCEDURE — G0378 HOSPITAL OBSERVATION PER HR: HCPCS

## 2024-09-14 PROCEDURE — 85018 HEMOGLOBIN: CPT | Performed by: HOSPITALIST

## 2024-09-14 PROCEDURE — 97166 OT EVAL MOD COMPLEX 45 MIN: CPT | Mod: GO

## 2024-09-14 PROCEDURE — 82607 VITAMIN B-12: CPT | Performed by: HOSPITALIST

## 2024-09-14 PROCEDURE — 99239 HOSP IP/OBS DSCHRG MGMT >30: CPT | Performed by: HOSPITALIST

## 2024-09-14 PROCEDURE — 97164 PT RE-EVAL EST PLAN CARE: CPT | Mod: GP

## 2024-09-14 PROCEDURE — 82746 ASSAY OF FOLIC ACID SERUM: CPT | Performed by: HOSPITALIST

## 2024-09-14 PROCEDURE — 97535 SELF CARE MNGMENT TRAINING: CPT | Mod: GO

## 2024-09-14 PROCEDURE — 84145 PROCALCITONIN (PCT): CPT | Performed by: HOSPITALIST

## 2024-09-14 RX ADMIN — THERA TABS 1 TABLET: TAB at 07:55

## 2024-09-14 RX ADMIN — LEVOTHYROXINE SODIUM 25 MCG: 25 TABLET ORAL at 07:56

## 2024-09-14 RX ADMIN — Medication 1 TABLET: at 07:56

## 2024-09-14 RX ADMIN — ACETAMINOPHEN 975 MG: 325 TABLET ORAL at 07:55

## 2024-09-14 RX ADMIN — TAMSULOSIN HYDROCHLORIDE 0.4 MG: 0.4 CAPSULE ORAL at 07:56

## 2024-09-14 RX ADMIN — ASPIRIN 81 MG: 81 TABLET, COATED ORAL at 07:56

## 2024-09-14 ASSESSMENT — ACTIVITIES OF DAILY LIVING (ADL)
ADLS_ACUITY_SCORE: 38
ADLS_ACUITY_SCORE: 38
ADLS_ACUITY_SCORE: 41
ADLS_ACUITY_SCORE: 41
ADLS_ACUITY_SCORE: 38
ADLS_ACUITY_SCORE: 41
ADLS_ACUITY_SCORE: 38
ADLS_ACUITY_SCORE: 41
ADLS_ACUITY_SCORE: 41
ADLS_ACUITY_SCORE: 38

## 2024-09-14 NOTE — PROGRESS NOTES
Care Management Discharge Note    Discharge Date: 09/14/2024       Discharge Disposition: Home, Outpatient Rehab (PT, OT, SLP, Cardiac or Pulmonary)    Discharge Services: None    Discharge DME: None    Discharge Transportation: family or friend will provide    Private pay costs discussed: Not applicable      Education Provided on the Discharge Plan: Yes (Potential discharge plans discussed with daughter)  Persons Notified of Discharge Plans: pt, daughter  Patient/Family in Agreement with the Plan: yes      Additional Information:  12:31 PM  SW met with patient and daughter to discuss home care recommendation.  Pt/daughter declined.  Pt will follow up with OP PT near his home.  Family to transport.  No discharge needs.    NAIN Meredith

## 2024-09-14 NOTE — PROGRESS NOTES
Occupational Therapy Discharge Summary    Reason for therapy discharge:    All goals and outcomes met, no further needs identified.    Progress towards therapy goal(s). See goals on Care Plan in [facility] electronic health record for goal details.  Goals met    Therapy recommendation(s):    Continued therapy is recommended.  Rationale/Recommendations:  Pt could benefit from further home OT to advance safe transfers and assess cognition as needed.

## 2024-09-14 NOTE — DISCHARGE SUMMARY
"[facility]  Hospitalist Discharge Summary      Date of Admission:  9/13/2024  Date of Discharge:  9/14/2024  Discharging Provider: Bert Ontiveros MD  Discharge Service: Hospitalist Service    Discharge Diagnoses   #Bleeding at the surgical site  #S/P left total knee arthroplasty 9/11, POD # 2   #History of hospital associated Delirium   #At risk for Sundowning   #Dementia  # Constipation   #Hypothyroidism  #History of prostate cancer  #BPH    Clinically Significant Risk Factors     # Overweight: Estimated body mass index is 29.83 kg/m  as calculated from the following:    Height as of this encounter: 1.753 m (5' 9\").    Weight as of this encounter: 91.6 kg (202 lb).       Follow-ups Needed After Discharge   Follow-up Appointments     Follow-up and recommended labs and tests       Follow up with primary care provider, Brant Parkinson MD, within 7 days for   hospital follow- up.    Surgery has cleared you and you will follow-up with your own primary care   team as well as with orthopedic surgery. Home healthcare and Home PT and   Home OT will be resumed/started.            Unresulted Labs Ordered in the Past 30 Days of this Admission       Date and Time Order Name Status Description    9/14/2024  7:21 AM Folate In process     9/14/2024  7:21 AM Vitamin B12 In process         These results will be followed up by [facility].    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 He was admitted for bleeding at the surgical site evaluation and orthopedics was consulted. By the next day the patient is back to his baseline and fully alert and oriented x4 and also had insight into his transient confusion, which was " likely multifactorial including hospital/healthcare-associated delirium, perioperative anesthesia and narcotic medications side effects, and uncontrolled pain. BY 9/14 AM, his pain is well controlled, he is back to his baseline, and orthopedics confirmed his bleeding, which has now ceased, is now consistent with usual and normal postoperative site bleeding without any further concerns or interventions recommended. Discharged back home with home healthcare services including home PT and home OT.     Consultations This Hospital Stay   CARE MANAGEMENT / SOCIAL WORK IP CONSULT  PHYSICAL THERAPY ADULT IP CONSULT  OCCUPATIONAL THERAPY ADULT IP CONSULT  ORTHOPEDIC SURGERY IP CONSULT  PHYSICAL THERAPY ADULT IP CONSULT    Code Status   Full Code    Time Spent on this Encounter   I, Bert Ontiveros MD, personally saw the patient today and spent greater than 30 minutes discharging this patient.       Bert Ontiveros MD  [address] [address]  Phone: [phone]  Fax: [phone]  ______________________________________________________________________    Physical Exam   Vital Signs: Temp: 98.7  F (37.1  C) Temp src: Oral BP: (!) 149/67 Pulse: 68   Resp: 16 SpO2: 94 % O2 Device: None (Room air)    Weight: 202 lbs 0 oz  GENERAL:  Alert, appears comfortable, in no acute distress, appears stated age, on ambient room air   HEAD:  Normocephalic, without obvious abnormality, atraumatic   EYES:  Conjunctiva/corneas clear, no scleral icterus, EOM's appears intact grossly   NOSE: Nares normal, septum midline, mucosa normal, no drainage   THROAT: Lips, mucosa, and tongue appear normal   NECK: Symmetrical, trachea appears midline   BACK:   Symmetric, no curvature noted   LUNGS:   Symmetric chest rise on inhalation, respirations unlabored   CHEST WALL:  No apparent deformity   HEART:  No thrills or heaves visualized   ABDOMEN:   No masses or organomegaly apparent;  non-scaphoid   EXTREMITIES: Extremities appear normal, atraumatic, no cyanosis; no active bleeding seen at left LE wound site that is well-covered and in bracing   SKIN: No exanthems in the visualized areas   NEURO: Alert and oriented x4, moves all four extremities freely and spontaneously   PSYCH: Cooperative, behavior is appropriate          Primary Care Physician   Brant Parkinson MD    Discharge Orders      Home Care Referral      Reason for your hospital stay    Post-surgical bleeding and delirium, likely multifactorial and medication/anesthesia induced.   You were evaluated by hospital medicine, orthopedic surgery, PT, and OT. You are now back to your baseline and fully alert and oriented. Surgery has cleared you and you will follow-up with your own primary care team as well as with orthopedic surgery. Home healthcare and Home PT and Home OT will be resumed/started.     Follow-up and recommended labs and tests     Follow up with primary care provider, Brant Parkinson MD, within 7 days for hospital follow- up.    Surgery has cleared you and you will follow-up with your own primary care team as well as with orthopedic surgery. Home healthcare and Home PT and Home OT will be resumed/started.     Activity    Your activity upon discharge: activity as tolerated     Diet    Follow this diet upon discharge: Orders Placed This Encounter      Combination Diet Low Saturated Fat Na <2400mg Diet, No Caffeine Diet         Significant Results and Procedures   Most Recent 3 CBC's:  Recent Labs   Lab Test 09/14/24  0740 09/13/24  0134 09/12/24  1009 09/12/24  0415 01/20/21  2133   WBC  --  6.8  --   --  10.5   HGB 9.4* 10.8* 10.2*   < > 13.2*   MCV  --  90  --   --  89   PLT  --  217  --   --  192    < > = values in this interval not displayed.     Most Recent 3 BMP's:  Recent Labs   Lab Test 09/13/24  0134 09/12/24  0415     --    POTASSIUM 4.0  --    CHLORIDE 105  --    CO2 24  --    BUN 13.0  --    CR 0.93  --    ANIONGAP  14  --    JIN 8.5*  --    * 126*     Most Recent 2 LFT's:No lab results found.,   Results for orders placed or performed during the hospital encounter of 09/13/24   XR Knee Left 3 Views    Narrative    EXAM: LEFT KNEE 3 VIEWS  LOCATION: [facility]  DATE/TIME: 9/13/2024 2:08 AM CDT    INDICATION: Status post recent knee arthroplasty. Bleeding. Fall.  COMPARISON: 9/11/2024 at 1448 hours.      Impression    IMPRESSION:   1. A left knee arthroplasty is in place. No evidence of hardware failure.  2. No visualized acute fracture or malalignment of the left knee.  3. Small knee joint effusion.        Discharge Medications   Current Discharge Medication List        CONTINUE these medications which have NOT CHANGED    Details   acetaminophen (TYLENOL) 500 MG tablet Take 1,000 mg by mouth every 6 hours as needed for mild pain.      Apalutamide 240 MG TABS Take 240 mg by mouth daily.      aspirin 81 MG EC tablet Take 1 tablet (81 mg) by mouth 2 times daily.  Qty: 84 tablet, Refills: 0    Associated Diagnoses: S/P total knee arthroplasty, left      donepezil (ARICEPT) 10 MG tablet Take 15 mg by mouth at bedtime.      fish oil-omega-3 fatty acids 1000 MG capsule Take 1 g by mouth daily.      levothyroxine (SYNTHROID/LEVOTHROID) 25 MCG tablet Take 25 mcg by mouth daily.      multivitamin, therapeutic (THERA-VIT) TABS tablet Take 1 tablet by mouth daily.      oxyCODONE (ROXICODONE) 5 MG tablet Take 1 tablet (5 mg) by mouth at bedtime.  Qty: 25 tablet, Refills: 0    Associated Diagnoses: S/P total knee arthroplasty, left      senna-docusate (SENOKOT-S/PERICOLACE) 8.6-50 MG tablet Take 1 tablet by mouth 2 times daily as needed for constipation.  Qty: 42 tablet, Refills: 0    Associated Diagnoses: S/P total knee arthroplasty, left      tamsulosin (FLOMAX) 0.4 MG capsule Take 0.4 mg by mouth daily.      vitamin B-Complex Take 1 tablet by mouth daily.           Allergies   No Known Allergies

## 2024-09-14 NOTE — PROGRESS NOTES
09/14/24 1040   Appointment Info   Signing Clinician's Name / Credentials (PT) Bela Sigala, PT, DPT   Rehab Comments (PT) Re-eval due to patient now in immobilizer due to bleeding   Living Environment   Current Living Arrangements house   Home Accessibility stairs to enter home   Number of Stairs, Main Entrance 3   Stair Railings, Main Entrance railing on right side (ascending)   Self-Care   Equipment Currently Used at Home walker, rolling   Fall history within last six months no   General Information   Onset of Illness/Injury or Date of Surgery 09/13/24   Referring Physician JACQUELINE Vargas   Patient/Family Therapy Goals Statement (PT) Go Home   Pertinent History of Current Problem (include personal factors and/or comorbidities that impact the POC) L TKA on 9/11, readmitted due to increased confusion and post-op bleeding   Existing Precautions/Restrictions   (Immobilizer on at all times per [facility] PA)   Weight-Bearing Status - LLE weight-bearing as tolerated   Bed Mobility   Bed Mobility supine-sit   Supine-Sit [place] (Bed Mobility) independent   Transfers   Transfers sit-stand transfer   Maintains Weight-bearing Status (Transfers) able to maintain   Sit-Stand Transfer   Sit-Stand [place] (Transfers) supervision;verbal cues   Assistive Device (Sit-Stand Transfers) walker, front-wheeled   Gait/Stairs (Locomotion)   [place] Level (Gait) supervision;verbal cues   Assistive Device (Gait) walker, front-wheeled   Distance in Feet (Gait) 125x2   Pattern (Gait) swing-through   Left Sided Gait Deviations hip hiking   Maintains Weight-bearing Status (Gait) able to maintain   Negotiation (Stairs) stairs independence;handrail location;number of steps;ascending technique;descending technique   [place] Level (Stairs) supervision;verbal cues   Handrail Location (Stairs) right side (ascending);left side (descending)   Number of Steps (Stairs) 4   Ascending Technique (Stairs) step-to-step   Descending  Technique (Stairs) step-to-step   Comment, (Gait/Stairs) Patient safe on stairs with immobilizer no concerns with ambulation or negotiating stairs at home   Clinical Impression   Criteria for Skilled Therapeutic Intervention Evaluation only   Clinical Presentation (PT Evaluation Complexity) stable   Clinical Presentation Rationale Pt presents as medically diagnosed   Clinical Decision Making (Complexity) low complexity   Risk & Benefits of therapy have been explained evaluation/treatment results reviewed;patient   PT Total Evaluation Time   PT Eval, Re-eval Minutes (13448) 15   PT Discharge Planning   PT Plan D/C PT   PT Discharge Recommendation (DC Rec) (S)  home with assist;home with outpatient physical therapy   PT Rationale for DC Rec Patient ambulating and negotiating stairs safely with FWW and immobilizer to left knee, recommend 24 hour supervision for safety at home   PT Brief overview of current status Amb 125ft with FWW, SBA   PT Equipment Needed at Discharge walker, rolling  (has own)   Total Session Time   Total Session Time (sum of timed and untimed services) 15

## 2024-09-14 NOTE — CONSULTS
[facility] Consultation    Consultation - [facility]  Level of consult: Consult, follow and place orders    Vijay Fernandez,  1945, MRN [id number]     Admitting Dx: History of dementia [Z86.59]  Status post total left knee replacement [Z96.652]     PCP: Brant Parkinson, [phone]     Code status:  Full Code     Extended Emergency Contact Information  Primary Emergency Contact: FELISA FELIX  Mobile Phone: [phone]  Relation: Significant other     Assessment: Post-op bleeding    Plan:  Bleeding at the incision site consistent with post-op bleeding. Knee is currently wrapped with gauze and an ACE wrap with the knee in an immobilizer. We will leave these dressings in place with the knee immobilizer on at all times until he is seen by the surgical team. Will have PT/OT evaluate and treat for gait training with the immobilizer. Okay to discharge from ortho standpoint once cleared by PT/OT.    Active Problems:    History of total knee arthroplasty, left    History of dementia    Status post total left knee replacement    Hypothyroidism    Prostate cancer metastatic to bone (H)    Hypercholesterolemia       Chief Complaint  Bleeding at surgical site     HPI  We have been requested by hospital medicine to evaluate Vijay Fernandez who is a 79 year old year old with prostate cancer with metastasis to bone, hypothyroidism, mild cognitive impaairment, dyslipidemia, dementia, and osteoarthritis status post left total k nee arthroplasty on 24 by Dr. Quirino Palomo. He presented to ED on 24 for concerns of bleeding at the surgical site. Voiding spontaneously and passing gas.        History is obtained from the patient and electronic health record     Past Medical History  Past Medical History:   Diagnosis Date    Arthritis     BPH with urinary obstruction     History of blood transfusion     Hypothyroidism     Left-sided low back pain without sciatica     MCI (mild  cognitive impairment)     Memory loss     Pelvic fracture (H) 1976    34 fractures in pelvis from crush injury    Prostate cancer metastatic to bone (H)        Surgical History  Past Surgical History:   Procedure Laterality Date    ARTHROPLASTY KNEE Left 9/11/2024    Procedure: LEFT TOTAL KNEE ARTHROPLASTY;  Surgeon: Miquel Palomo MD;  Location: [facility] Main OR    AS TOTAL KNEE ARTHROPLASTY Right 2005    TONSILLECTOMY & ADENOIDECTOMY          Social History  Social History     Socioeconomic History    Marital status: Single     Spouse name: Not on file    Number of children: Not on file    Years of education: Not on file    Highest education level: Not on file   Occupational History    Not on file   Tobacco Use    Smoking status: Never    Smokeless tobacco: Never   Vaping Use    Vaping status: Never Used   Substance and Sexual Activity    Alcohol use: Yes     Comment: 3-7 drinks per week    Drug use: Never    Sexual activity: Not on file   Other Topics Concern    Not on file   Social History Narrative    Not on file     Social Determinants of Health     Financial Resource Strain: Low Risk  (9/12/2024)    Financial Resource Strain     Within the past 12 months, have you or your family members you live with been unable to get utilities (heat, electricity) when it was really needed?: No   Food Insecurity: Low Risk  (9/12/2024)    Food Insecurity     Within the past 12 months, did you worry that your food would run out before you got money to buy more?: No     Within the past 12 months, did the food you bought just not last and you didn t have money to get more?: No   Transportation Needs: Low Risk  (9/12/2024)    Transportation Needs     Within the past 12 months, has lack of transportation kept you from medical appointments, getting your medicines, non-medical meetings or appointments, work, or from getting things that you need?: No   Physical Activity: Not on file   Stress: Not on file   Social  Connections: Unknown (1/1/2022)    [facility]    Social Connections     Frequency of Communication with Friends and Family: Not on file   Interpersonal Safety: Low Risk  (9/11/2024)    Interpersonal Safety     Do you feel physically and emotionally safe where you currently live?: Yes     Within the past 12 months, have you been hit, slapped, kicked or otherwise physically hurt by someone?: No     Within the past 12 months, have you been humiliated or emotionally abused in other ways by your partner or ex-partner?: No   Housing Stability: Low Risk  (9/12/2024)    Housing Stability     Do you have housing? : Yes     Are you worried about losing your housing?: No       Family History  No family history on file.     Allergies:  Patient has no known allergies.      Current Medications:  Current Facility-Administered Medications   Medication Dose Route Frequency Provider Last Rate Last Admin    acetaminophen (TYLENOL) tablet 975 mg  975 mg Oral Q8H PRN Bert Ontiveros MD   975 mg at 09/14/24 0755    Apalutamide TABS 240 mg  240 mg Oral Daily Bert Ontiveros MD        aspirin EC tablet 81 mg  81 mg Oral BID Bert Ontiveros MD   81 mg at 09/14/24 0756    calcium carbonate (TUMS) chewable tablet 1,000 mg  1,000 mg Oral 4x Daily PRN Bert Ontiveros MD        capsaicin (ZOSTRIX) 0.025 % cream   Topical TID PRN Elza Solomon MD        donepezil (ARICEPT) tablet 15 mg  15 mg Oral At Bedtime Bert Ontiveros MD   15 mg at 09/13/24 2120    folic acid-vit B6-vit B12 (FOLGARD) per tablet 1 tablet  1 tablet Oral Daily Bert Ontiveros MD   1 tablet at 09/14/24 0756    levothyroxine (SYNTHROID/LEVOTHROID) tablet 25 mcg  25 mcg Oral Daily Bert Ontiveros MD   25 mcg at 09/14/24 0756    lidocaine (LMX4) cream   Topical Q1H PRN Bert Ontiveros MD        lidocaine 1 % 0.1-1 mL  0.1-1 mL Other Q1H PRN Bert Ontiveros MD        multivitamin, therapeutic (THERA-VIT) tablet 1 tablet   1 tablet Oral Daily Bert Ontiveros MD   1 tablet at 09/14/24 0755    naloxone (NARCAN) injection 0.2 mg  0.2 mg Intravenous Q2 Min PRN Bert Ontiveros MD        Or    naloxone (NARCAN) injection 0.4 mg  0.4 mg Intravenous Q2 Min PRN Bert Ontiveros MD        Or    naloxone (NARCAN) injection 0.2 mg  0.2 mg Intramuscular Q2 Min PRN Bert Ontiveros MD        Or    naloxone (NARCAN) injection 0.4 mg  0.4 mg Intramuscular Q2 Min PRN Bert Ontiveros MD        oxyCODONE (ROXICODONE) tablet 5 mg  5 mg Oral At Bedtime Bert Ontiveros MD   5 mg at 09/13/24 2119    senna-docusate (SENOKOT-S/PERICOLACE) 8.6-50 MG per tablet 1 tablet  1 tablet Oral BID PRN Bert Ontiveros MD        Or    senna-docusate (SENOKOT-S/PERICOLACE) 8.6-50 MG per tablet 2 tablet  2 tablet Oral BID PRN Bert Ontiveros MD        sodium chloride (PF) 0.9% PF flush 3 mL  3 mL Intracatheter Q8H Bert Ontiveros MD   3 mL at 09/14/24 0756    sodium chloride (PF) 0.9% PF flush 3 mL  3 mL Intracatheter q1 min prn Bert Ontiveros MD        tamsulosin (FLOMAX) capsule 0.4 mg  0.4 mg Oral Daily Bert Ontiveros MD   0.4 mg at 09/14/24 0756       Review of Systems:  Negative for fever, chills, dizziness, lightheadedness, nausea, vomiting, chest pain, SOB.    Physical Exam:  Temp:  [97.3  F (36.3  C)-99.9  F (37.7  C)] 98.7  F (37.1  C)  Pulse:  [65-89] 68  Resp:  [16-19] 16  BP: (115-166)/(57-76) 149/67  SpO2:  [94 %-97 %] 94 %    Left lower extremity: surgical incision of left knee is well-healing proximally. Distally, the incision is covered with 4x4s and dried blood. No erythema or exudates. No active bleeding. Deferred knee ROM secondary to knee immobilizer. Dorsalid pedis 2+. Plantarflexion/dorsiflexion 5/5. Normal sensation throughout.      Pertinent Labs  Lab Results: personally reviewed.  Lab Results   Component Value Date    WBC 6.8 09/13/2024    HGB 9.4 (L) 09/14/2024    HCT 31.9 (L) 09/13/2024    MCV 90 09/13/2024     09/13/2024     No  "results for input(s): \"INR\" in the last 168 hours.    Pertinent Radiology  Radiology Results: images and radiology report reviewed  No results found for this or any previous visit (from the past 24 hour(s)).    Attestation:  I have reviewed today's vital signs, notes, medications, labs and imaging.     Juana Morelos PA-C    "

## 2024-09-14 NOTE — PLAN OF CARE
Goal Outcome Evaluation:  Problem: Adult Inpatient Plan of Care  Goal: Absence of Hospital-Acquired Illness or Injury  Intervention: Identify and Manage Fall Risk  Problem: Adult Inpatient Plan of Care  Goal: Absence of Hospital-Acquired Illness or Injury  Intervention: Prevent Skin Injury  Problem: Knee Arthroplasty  Goal: Optimal Pain Control and Function  Intervention: Prevent or Manage Pain  Patient vital signs are at baseline: Yes, elevated tempt, but managed after PRN PO Tylenols administered.  Patient able to ambulate as they were prior to admission or with assist devices provided by therapies during their stay:  Yes,  up with Ax1 with walker and gaitbelt  Patient MUST void prior to discharge:  Yes  Patient able to tolerate oral intake:  Yes  Pain has adequate pain control using Oral analgesics:  Yes, rated minimal pain. PRN PO Tylenols administered. Ice therapy offered but declined.   Does patient have an identified :  Yes  Has goal D/C date and time been discussed with patient:  Yes    A&Ox4. CMS intact. Dressing CDI. Pt cleared to be discharged. Went over discharge papers with pt and daughter. Pt verbalizing understanding and had no further questions. IV removed. Pt discharged home with daughter in stable conditions.

## 2024-09-14 NOTE — PLAN OF CARE
Problem: Adult Inpatient Plan of Care  Goal: Absence of Hospital-Acquired Illness or Injury  Intervention: Identify and Manage Fall Risk  Recent Flowsheet Documentation  Taken 9/14/2024 0530 by Kaur Nnio RN  Safety Promotion/Fall Prevention:   activity supervised   supervised activity   safety round/check completed   room near nurse's station   room door open   nonskid shoes/slippers when out of bed  Taken 9/14/2024 0200 by Kaur Nino RN  Safety Promotion/Fall Prevention:   activity supervised   supervised activity   safety round/check completed   room near nurse's station   room door open   nonskid shoes/slippers when out of bed  Taken 9/13/2024 2200 by Kaur Nino RN  Safety Promotion/Fall Prevention:   activity supervised   supervised activity   safety round/check completed   room near nurse's station   room door open   nonskid shoes/slippers when out of bed  Taken 9/13/2024 1700 by Kaur Nino RN  Safety Promotion/Fall Prevention:   activity supervised   supervised activity   safety round/check completed   room near nurse's station   room door open   nonskid shoes/slippers when out of bed   Goal Outcome Evaluation:      Plan of Care Reviewed With: patient    Overall Patient Progress: improvingOverall Patient Progress: improving

## 2024-09-14 NOTE — PROGRESS NOTES
"PRIMARY DIAGNOSIS: \"GENERIC\" NURSING  OUTPATIENT/OBSERVATION GOALS TO BE MET BEFORE DISCHARGE:  ADLs back to baseline: Yes    Activity and level of assistance: Up with Ax1 with walker and gaitbelt    Pain status: Improved-controlled with oral pain medications.    Return to near baseline physical activity: Yes     Discharge Planner Nurse   Safe discharge environment identified: Yes  Barriers to discharge: Yes       Entered by: Meliza Quezada RN 09/14/2024 1:59 PM     Please review provider order for any additional goals.   Nurse to notify provider when observation goals have been met and patient is ready for discharge.  "

## 2024-09-14 NOTE — PROGRESS NOTES
09/14/24 0910   Appointment Info   Signing Clinician's Name / Credentials (OT) Carolyn Hernandez, OTR/L   Rehab Comments (OT) OT Evaluation   Quick Adds   Quick Adds Certification   Living Environment   People in Home significant other  (pt reports he will go home to his daughter's house first and then return to home w/sig other)   Current Living Arrangements house   Living Environment Comments Per previous admission note 9/12-once in home pt can stay on one level. Pt is I w/all ADLs/IADLs at baseline and is a . Hss tub/shower w/grab bar reports w/likely get a bench. Pt is unaware of height of his toilet or surfaces nearby that he could use   Self-Care   Usual Activity Tolerance good   Current Activity Tolerance moderate   Fall history within last six months no   General Information   Onset of Illness/Injury or Date of Surgery 09/13/24   Referring Physician Dr. 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 Cognitve deficits, per note, daughter reports sundowning observed.   Existing Precautions/Restrictions weight bearing;other (see comments)   Limitations/Impairments safety/cognitive   Left Lower Extremity (Weight-bearing Status) weight-bearing as tolerated (WBAT)  (immobolizer at all times for compression-ok to remove for changing clothing as long and L LE is kept straight, no showering at this time-Per ortho)   Cognitive Status Examination   Orientation Status orientation to person, place and time   Follows Commands follows one-step commands   Executive Function Deficit insight/awareness of deficits;judgment;problem-solving/reasoning   Cognitive Status Comments Increased support for problem solving how to manage ADLs while wearing immobilizer   Pain Assessment   Patient Currently in Pain No   Range of Motion  Comprehensive   General Range of Motion bilateral upper extremity ROM WFL   Strength Comprehensive (MMT)   General Manual Muscle Testing (MMT) Assessment upper extremity strength deficits identified   Bed Mobility   Comment (Bed Mobility) CGA   Transfers   Transfers bed-chair transfer;toilet transfer;sit-stand transfer   Transfer Skill: Bed to Chair/Chair to Bed   Transfer Comments CGA   Sit-Stand Transfer   Sit/Stand Transfer Comments CGA   Toilet Transfer   Toilet Transfer Comments CGA   Balance   Balance Comments SBA   Activities of Daily Living   BADL Assessment/Intervention lower body dressing;toileting   Lower Body Dressing Assessment/Training   Comment, (Lower Body Dressing) Mod A   Toileting   Comment, (Toileting) CGA   Clinical Impression   Criteria for Skilled Therapeutic Interventions Met (OT) Yes, treatment indicated   OT Diagnosis Decreased ADL independence   Influenced by the following impairments L TKA w/immbolizer   OT Problem List-Impairments impacting ADL cognition;balance;post-surgical precautions   Assessment of Occupational Performance 3-5 Performance Deficits   Identified Performance Deficits bed mob, dressing, toileting, grooming   Planned Therapy Interventions (OT) ADL retraining;bed mobility training;transfer training   Clinical Decision Making Complexity (OT) detailed assessment/moderate complexity   Risk & Benefits of therapy have been explained evaluation/treatment results reviewed   OT Total Evaluation Time   OT Eval, Moderate Complexity Minutes (70846) 10   Therapy Certification   Start of Care Date 09/14/24   Certification date from 09/14/24   Certification date to 10/14/24   Medical Diagnosis s/p L TKA w/increased bleeding of wound   OT Goals   Therapy Frequency (OT) Daily   OT Predicted Duration/Target Date for Goal Attainment 09/15/24   OT Goals Lower Body Dressing   OT: Lower Body Dressing Minimal assist;within precautions   OT: Bed Mobility Modified independent;Goal Met   OT:  Transfer Supervision/stand-by assist;within precautions   Self-Care/Home Management   Self-Care/Home Mgmt/ADL, Compensatory, Meal Prep Minutes (31875) 20   Symptoms Noted During/After Treatment (Meal Preparation/Planning Training) none   Treatment Detail/Skilled Intervention Pt in bed upon arrival and agreeable to OT evaluation. Increaed time for education and problem solving ADLs w/recent change of needing to wear immobolizer at all times. Pt believe spouse can assist w/dressing if needed. Pt completed supine<>sit w/SBA. Increased vc for hand placement for transfer and cues for ambulation w/FWW. Completed toilet transfer w/vc for hand placement and use of surfaces next to pt. Pt was unable to describe home environment but expressed he will be able to figure it out. CGA w/ambulation using FWW and cues to step back to surface to have non-surgical leg bump into it, cues to extend L LE to manage immobilzer and for hand placement.   OT Discharge Planning   OT Plan OT will connect w/family upon arrival to practice and ed safe tech w/ADLs wearing immobilizer   OT Discharge Recommendation (DC Rec) home with assist;home with home care occupational therapy   OT Rationale for DC Rec Cognitively pt appears to need 24 hour for safety and problem soving need and different situations including ADL management w/immbolizer. Pt would benefit from continued ADL progression with home OT.   OT Brief overview of current status CGA/SBA given vc for safe tech w/transfers, will need assistance w/dressing   Total Session Time   Timed Code Treatment Minutes 20   Total Session Time (sum of timed and untimed services) 30    [facility]  OUTPATIENT OCCUPATIONAL THERAPY  EVALUATION  PLAN OF TREATMENT FOR OUTPATIENT REHABILITATION  (COMPLETE FOR INITIAL CLAIMS ONLY)  Patient's Last Name, First Name, M.I.  YOB: 1945  Vijay Fernandez                          Provider's Name  [facility]  [facility] Medical Record No.  [id number]                             Onset Date:  09/13/24   Start of Care Date:  09/14/24   Type:     ___PT   _X_OT   ___SLP Medical Diagnosis:  s/p L TKA w/increased bleeding of wound                    OT Diagnosis:  Decreased ADL independence Visits from SOC:  1     See note for plan of treatment, functional goals and certification details    I CERTIFY THE NEED FOR THESE SERVICES FURNISHED UNDER        THIS PLAN OF TREATMENT AND WHILE UNDER MY CARE     (Physician co-signature of this document indicates review and certification of the therapy plan).

## 2024-09-17 ENCOUNTER — PATIENT OUTREACH (OUTPATIENT)
Dept: CARE COORDINATION | Facility: CLINIC | Age: 79
End: 2024-09-17
Payer: COMMERCIAL

## 2024-09-17 NOTE — PROGRESS NOTES
Connected Care Resource Center:   [facility] Contact  [facility]/Voicemail     Clinical Data: Post-Discharge Outreach     Outreach attempted x 2.  Left message on patient's voicemail, providing [facility]'s central phone number of [phone] ([phone]) for questions/concerns and/or to schedule an appt with an [facility] provider, if they do not have a PCP.      Plan:  [facility] will do no further outreaches at this time.       JANETTE Crowder  Connected Care Resource Center, [facility]    *Connected Care Resource Team does NOT follow patient ongoing. Referrals are identified based on internal discharge reports and the outreach is to ensure patient has an understanding of their discharge instructions.

## 2024-09-26 NOTE — ADDENDUM NOTE
Addendum  created 09/26/24 1004 by Jono Whaley MD    Intraprocedure Event edited, SmartForm saved

## (undated) DEVICE — DECANTER VIAL 2006S

## (undated) DEVICE — HOOD SURG T7 PLUS STRL LF DISP 0416-801-200

## (undated) DEVICE — SUCTION MANIFOLD NEPTUNE 2 SYS 4 PORT 0702-020-000

## (undated) DEVICE — GLOVE BIOGEL INDICATOR 7.5 LF 41675

## (undated) DEVICE — CAST PADDING 6IN COTTON WEBRIL STERILE 2554

## (undated) DEVICE — HOOD SURG T7PLUS PEEL AWAY FACE SHIELD STRL LF 0416-801-100

## (undated) DEVICE — BLADE SAW SAGITTAL STRK DUAL CUT 4118-135-090

## (undated) DEVICE — CUSTOM PACK TOTAL KNEE ACCESSORY SOP5BTAHEA

## (undated) DEVICE — ELECTRODE PATIENT RETURN ADULT L10 FT 2 PLATE CORD 0855C

## (undated) DEVICE — HOLDER LIMB VELCRO OR 0814-1533

## (undated) DEVICE — SOL WATER IRRIG 1000ML BOTTLE 2F7114

## (undated) DEVICE — SOL NACL 0.9% IRRIG 1000ML BOTTLE 2F7124

## (undated) DEVICE — DRILL PIN HEADLESS TROCAR 00-5901-020-00

## (undated) DEVICE — CUSTOM PACK TOTAL KNEE SOP5BTKHEC

## (undated) DEVICE — SUTURE MONOCRYL+ 2-0 27IN CT2 MCP333H

## (undated) DEVICE — GLOVE BIOGEL PI ULTRATOUCH G SZ 7.5 42175

## (undated) DEVICE — SOL NACL 0.9% INJ 1000ML BAG 2B1324X

## (undated) DEVICE — GLOVE BIOGEL PI INDICATOR 8.0 LF 41680

## (undated) DEVICE — DRILL PIN

## (undated) DEVICE — GLOVE BIOGEL PI SZ 8.0 40880

## (undated) DEVICE — SU STRATAFIX PDS PLUS 1 CT-1 18" SXPP1A404

## (undated) DEVICE — SU STRATAFIX MONOCRYL 3-0 SPIRAL PS-2 30CM SXMP1B106

## (undated) RX ORDER — GLYCOPYRROLATE 0.2 MG/ML
INJECTION, SOLUTION INTRAMUSCULAR; INTRAVENOUS
Status: DISPENSED
Start: 2024-09-11

## (undated) RX ORDER — EPHEDRINE SULFATE 50 MG/ML
INJECTION, SOLUTION INTRAMUSCULAR; INTRAVENOUS; SUBCUTANEOUS
Status: DISPENSED
Start: 2024-09-11